# Patient Record
Sex: FEMALE | Race: WHITE | NOT HISPANIC OR LATINO | Employment: FULL TIME | URBAN - METROPOLITAN AREA
[De-identification: names, ages, dates, MRNs, and addresses within clinical notes are randomized per-mention and may not be internally consistent; named-entity substitution may affect disease eponyms.]

---

## 2024-04-12 ENCOUNTER — TELEPHONE (OUTPATIENT)
Dept: OBGYN | Facility: CLINIC | Age: 29
End: 2024-04-12
Payer: COMMERCIAL

## 2024-04-12 NOTE — LETTER
You are scheduled in clinic on April 24th at 1 PM for Mifepristone.  This is an oral medication, which will be discussed with the provider before administration.      The parking ramp connected to our building will be closed for nearby maintenenance.  You can park in the YELLOW or GREEN parking ramps and follow the signs to the [facility].  We are located on the third floor.     Induction   Your induction of labor is schedule for April 25th, 2024 at 730 AM.    You should park in the GREEN PARKING RAMP.  When you enter the hospital, stop at the security desk, and they will direct you to the Birth Place.  You will take the east elevator to the fourth floor. Follow the sign for the Birth Place door- you will have to press the buzzer to get to the nurse's station. You may check in a this nurses station.     [facility]   [address] [place] [address]   birth place phone number [phone]   fourth floor

## 2024-04-12 NOTE — LETTER
Financial Assistance for Termination of Pregnancy    Important things to know before you call:     Our clinic works closely with all of these resources. Please let them know you are working with a care coordinator at [facility] ([facility]) or [facility]. Our clinic has two names, and if you hear either of these names, please know it is the same place! Please have the cost-of-care estimate with you when you call because each resource is going to want this number. Make sure you keep detailed notes of how much each organization is going to pledge to you. Please know our  will work directly with the organization to transfer the money.     Resources/Organizations:     [facility] [phone]    [facility] [phone]    [facility] [phone]    [place] [employer] in [employer] (WIN) Fund at info@ndwinfund.org    [employer]ARY (justice empowerment network) 3-744-361-4628    [place]  Fund [phone] CECILIA,W, F 6AM-2PM    Our Justice apply online [email or web address]

## 2024-04-12 NOTE — TELEPHONE ENCOUNTER
Referral received from [facility] for IOL for TOP.  She is being referred to [facility] because of fetal anomalies-fetal cerebral ventriculomegaly  Gestational age 20w5d  EDD8/25/24  Medical records have been received    An email has been sent to Alana/financial counselor to verify insurance    Spoke with Naty and explained process.      They have not yet considered their options for disposition of remains.    They were told by [facility] that genetic testing could be done on placenta after delivery, and they would like this done.    Will contact Naty after insurance verification

## 2024-04-12 NOTE — TELEPHONE ENCOUNTER
Non-covered benefit.  Cost of care estimate as follows;    Details  Total Fees 20,049.94  Hospital Fees 16,731.94  Physician Fees 3,318.00  Fees historically vary from  14,329.92 to 41,871.61  Discounts -11,142.22  You Pay  1 of 3  8,907.72

## 2024-04-15 NOTE — TELEPHONE ENCOUNTER
Confirmed with Naty that she received cost of care estimate and financial resources.  She will reach out to resources today, and let us know when she is ready to proceed

## 2024-04-16 NOTE — TELEPHONE ENCOUNTER
Patient has secured $4500 in pledges, and has paid remaining balance.      Requests induction on 4/25/24.  Scheduled at 730 AM per her request.    She will come to clinic on 4/24/24 for mifepristone.    Letter generated.    They plan on private cremation and would like mementos collected at time of delivery

## 2024-04-18 ASSESSMENT — ANXIETY QUESTIONNAIRES
3. WORRYING TOO MUCH ABOUT DIFFERENT THINGS: NOT AT ALL
GAD7 TOTAL SCORE: 1
1. FEELING NERVOUS, ANXIOUS, OR ON EDGE: SEVERAL DAYS
4. TROUBLE RELAXING: NOT AT ALL
6. BECOMING EASILY ANNOYED OR IRRITABLE: NOT AT ALL
GAD7 TOTAL SCORE: 1
8. IF YOU CHECKED OFF ANY PROBLEMS, HOW DIFFICULT HAVE THESE MADE IT FOR YOU TO DO YOUR WORK, TAKE CARE OF THINGS AT HOME, OR GET ALONG WITH OTHER PEOPLE?: NOT DIFFICULT AT ALL
7. FEELING AFRAID AS IF SOMETHING AWFUL MIGHT HAPPEN: NOT AT ALL
7. FEELING AFRAID AS IF SOMETHING AWFUL MIGHT HAPPEN: NOT AT ALL
IF YOU CHECKED OFF ANY PROBLEMS ON THIS QUESTIONNAIRE, HOW DIFFICULT HAVE THESE PROBLEMS MADE IT FOR YOU TO DO YOUR WORK, TAKE CARE OF THINGS AT HOME, OR GET ALONG WITH OTHER PEOPLE: NOT DIFFICULT AT ALL
5. BEING SO RESTLESS THAT IT IS HARD TO SIT STILL: NOT AT ALL
2. NOT BEING ABLE TO STOP OR CONTROL WORRYING: NOT AT ALL

## 2024-04-22 NOTE — TELEPHONE ENCOUNTER
Received report from MILENA Joshi. Referral reviewed.     Patient is scheduled for an IOL on 04/25 and will be in clinic for mifepristone the day before.

## 2024-04-23 NOTE — TELEPHONE ENCOUNTER
Called patient for check in prior to upcoming procedures starting tomorrow. Patient denies questions, concerns or requests at the moment. Encouraged patient to call clinic if any questions, concerns or requests comes up. Patient agreeable.

## 2024-04-24 ENCOUNTER — OFFICE VISIT (OUTPATIENT)
Dept: OBGYN | Facility: CLINIC | Age: 29
End: 2024-04-24
Attending: OBSTETRICS & GYNECOLOGY
Payer: COMMERCIAL

## 2024-04-24 VITALS
DIASTOLIC BLOOD PRESSURE: 78 MMHG | HEIGHT: 63 IN | BODY MASS INDEX: 28.81 KG/M2 | WEIGHT: 162.6 LBS | SYSTOLIC BLOOD PRESSURE: 121 MMHG | HEART RATE: 99 BPM

## 2024-04-24 DIAGNOSIS — O35.9XX0 FETAL ANOMALY NECESSITATING DELIVERY, SINGLE OR UNSPECIFIED FETUS: Primary | ICD-10-CM

## 2024-04-24 PROCEDURE — 99214 OFFICE O/P EST MOD 30 MIN: CPT | Performed by: OBSTETRICS & GYNECOLOGY

## 2024-04-24 PROCEDURE — 250N000013 HC RX MED GY IP 250 OP 250 PS 637

## 2024-04-24 PROCEDURE — 250N000013 HC RX MED GY IP 250 OP 250 PS 637: Performed by: OBSTETRICS & GYNECOLOGY

## 2024-04-24 PROCEDURE — 99204 OFFICE O/P NEW MOD 45 MIN: CPT | Performed by: OBSTETRICS & GYNECOLOGY

## 2024-04-24 RX ORDER — MIFEPRISTONE 200 MG/1
200 TABLET ORAL ONCE
Status: DISCONTINUED | OUTPATIENT
Start: 2024-04-24 | End: 2024-04-24

## 2024-04-24 RX ADMIN — Medication 200 MG: at 13:17

## 2024-04-24 NOTE — PROGRESS NOTES
29 yo  at 20+5 weeks here for mife administration in advance of termination induction IOL  This pregnancy complicated by lethal anomalies including: hydrops, left mild fetal ventriculomegaly, monosomy X, 1%ile growth, large cystic hygroma.   Risks, options, expectations reviewed in detail  Mife administered 200 mg    Advised if any concerns overnight to call or come in  Otherwise present to L and D tomorrow    Osiris Raphael MD

## 2024-04-24 NOTE — PATIENT INSTRUCTIONS
Received a call from pt asking if he needed to reschedule appt on 4/26 with Dr. Fulton d/t lab work scheduled 4/24. Reviewed schedule. Added for labs on 4/17. Pt will need transport for 4/17 and 4/24.  Updated Gracia/Transport Thank you for trusting us with your care!     If you need to contact us for questions about:  Symptoms, Scheduling & Medical Questions; Non-urgent (2-3 day response) Chilo message, Urgent (needing response today) [phone] (if after 3:30pm next day response)   Prescriptions: Please call your Pharmacy   Billing: [place] [phone] or [facility]:[phone]

## 2024-04-24 NOTE — LETTER
2024       RE: Naty Joseph  [address] [place] [address]     Dear Colleague,    Thank you for referring your patient, Naty Joseph, to the [facility] [place] at [facility]. Please see a copy of my visit note below.    27 yo  at 20+5 weeks here for mife administration in advance of termination induction IOL  This pregnancy complicated by lethal anomalies including: hydrops, left mild fetal ventriculomegaly, monosomy X, 1%ile growth, large cystic hygroma.   Risks, options, expectations reviewed in detail  Mife administered 200 mg    Advised if any concerns overnight to call or come in  Otherwise present to L and D tomorrow    Osiris Raphael MD

## 2024-04-25 ENCOUNTER — ANESTHESIA (OUTPATIENT)
Dept: OBGYN | Facility: CLINIC | Age: 29
End: 2024-04-25
Payer: COMMERCIAL

## 2024-04-25 ENCOUNTER — HOSPITAL ENCOUNTER (INPATIENT)
Facility: CLINIC | Age: 29
LOS: 4 days | Discharge: HOME | End: 2024-04-29
Attending: OBSTETRICS & GYNECOLOGY | Admitting: OBSTETRICS & GYNECOLOGY
Payer: COMMERCIAL

## 2024-04-25 ENCOUNTER — ANESTHESIA EVENT (OUTPATIENT)
Dept: OBGYN | Facility: CLINIC | Age: 29
End: 2024-04-25
Payer: COMMERCIAL

## 2024-04-25 DIAGNOSIS — Z90.710 S/P ABDOMINAL HYSTERECTOMY: ICD-10-CM

## 2024-04-25 PROBLEM — Z34.90 PREGNANCY: Status: ACTIVE | Noted: 2024-04-25

## 2024-04-25 LAB
ABO/RH(D): NORMAL
ANION GAP SERPL CALCULATED.3IONS-SCNC: 9 MMOL/L (ref 7–15)
ANTIBODY SCREEN: NEGATIVE
APTT PPP: 36 SECONDS (ref 22–38)
APTT PPP: 39 SECONDS (ref 22–38)
APTT PPP: 44 SECONDS (ref 22–38)
BASE EXCESS BLDA CALC-SCNC: -5.2 MMOL/L (ref -3–3)
BASE EXCESS BLDA CALC-SCNC: -7.6 MMOL/L (ref -3–3)
BASE EXCESS BLDV CALC-SCNC: -3.1 MMOL/L (ref -3–3)
BASE EXCESS BLDV CALC-SCNC: -8.6 MMOL/L (ref -3–3)
BASOPHILS # BLD AUTO: 0 10E3/UL (ref 0–0.2)
BASOPHILS NFR BLD AUTO: 0 %
BLD PROD TYP BPU: NORMAL
BLOOD COMPONENT TYPE: NORMAL
BUN SERPL-MCNC: 7.5 MG/DL (ref 6–20)
CA-I BLD-MCNC: 3.4 MG/DL (ref 4.4–5.2)
CA-I BLD-MCNC: 3.7 MG/DL (ref 4.4–5.2)
CA-I BLD-MCNC: 4.5 MG/DL (ref 4.4–5.2)
CA-I BLD-MCNC: 5 MG/DL (ref 4.4–5.2)
CALCIUM SERPL-MCNC: 7.9 MG/DL (ref 8.6–10)
CHLORIDE SERPL-SCNC: 109 MMOL/L (ref 98–107)
CODING SYSTEM: NORMAL
CREAT SERPL-MCNC: 0.48 MG/DL (ref 0.51–0.95)
CROSSMATCH: NORMAL
D DIMER PPP FEU-MCNC: >20 UG/ML FEU (ref 0–0.5)
D DIMER PPP FEU-MCNC: >20 UG/ML FEU (ref 0–0.5)
DEPRECATED HCO3 PLAS-SCNC: 19 MMOL/L (ref 22–29)
EGFRCR SERPLBLD CKD-EPI 2021: >90 ML/MIN/1.73M2
EOSINOPHIL # BLD AUTO: 0 10E3/UL (ref 0–0.7)
EOSINOPHIL NFR BLD AUTO: 0 %
ERYTHROCYTE [DISTWIDTH] IN BLOOD BY AUTOMATED COUNT: 13.2 % (ref 10–15)
ERYTHROCYTE [DISTWIDTH] IN BLOOD BY AUTOMATED COUNT: 13.3 % (ref 10–15)
ERYTHROCYTE [DISTWIDTH] IN BLOOD BY AUTOMATED COUNT: 13.6 % (ref 10–15)
ERYTHROCYTE [DISTWIDTH] IN BLOOD BY AUTOMATED COUNT: 14.9 % (ref 10–15)
FIBRINOGEN PPP-MCNC: 278 MG/DL (ref 170–490)
FIBRINOGEN PPP-MCNC: 91 MG/DL (ref 170–490)
GLUCOSE BLD-MCNC: 107 MG/DL (ref 70–99)
GLUCOSE BLD-MCNC: 109 MG/DL (ref 70–99)
GLUCOSE BLD-MCNC: 154 MG/DL (ref 70–99)
GLUCOSE BLD-MCNC: 83 MG/DL (ref 70–99)
GLUCOSE SERPL-MCNC: 138 MG/DL (ref 70–99)
HCO3 BLDA-SCNC: 18 MMOL/L (ref 21–28)
HCO3 BLDA-SCNC: 20 MMOL/L (ref 21–28)
HCO3 BLDV-SCNC: 16 MMOL/L (ref 21–28)
HCO3 BLDV-SCNC: 21 MMOL/L (ref 21–28)
HCT VFR BLD AUTO: 19.3 % (ref 35–47)
HCT VFR BLD AUTO: 23.9 % (ref 35–47)
HCT VFR BLD AUTO: 29.2 % (ref 35–47)
HCT VFR BLD AUTO: 34.3 % (ref 35–47)
HGB BLD-MCNC: 10.2 G/DL (ref 11.7–15.7)
HGB BLD-MCNC: 10.6 G/DL (ref 11.7–15.7)
HGB BLD-MCNC: 11.8 G/DL (ref 11.7–15.7)
HGB BLD-MCNC: 6.7 G/DL (ref 11.7–15.7)
HGB BLD-MCNC: 8 G/DL (ref 11.7–15.7)
HGB BLD-MCNC: 8.5 G/DL (ref 11.7–15.7)
HGB BLD-MCNC: 9.1 G/DL (ref 11.7–15.7)
HGB BLD-MCNC: 9.8 G/DL (ref 11.7–15.7)
IMM GRANULOCYTES # BLD: 0 10E3/UL
IMM GRANULOCYTES NFR BLD: 0 %
INR PPP: 1.23 (ref 0.85–1.15)
INR PPP: 1.34 (ref 0.85–1.15)
INR PPP: 1.79 (ref 0.85–1.15)
ISSUE DATE AND TIME: NORMAL
LACTATE BLD-SCNC: 0.9 MMOL/L (ref 0.7–2)
LACTATE BLD-SCNC: 1.1 MMOL/L (ref 0.7–2)
LACTATE BLD-SCNC: 1.5 MMOL/L (ref 0.7–2)
LACTATE BLD-SCNC: 2.2 MMOL/L (ref 0.7–2)
LYMPHOCYTES # BLD AUTO: 1.5 10E3/UL (ref 0.8–5.3)
LYMPHOCYTES NFR BLD AUTO: 16 %
MAGNESIUM SERPL-MCNC: 1.2 MG/DL (ref 1.7–2.3)
MCH RBC QN AUTO: 29 PG (ref 26.5–33)
MCH RBC QN AUTO: 31.4 PG (ref 26.5–33)
MCH RBC QN AUTO: 31.4 PG (ref 26.5–33)
MCH RBC QN AUTO: 31.6 PG (ref 26.5–33)
MCHC RBC AUTO-ENTMCNC: 33.5 G/DL (ref 31.5–36.5)
MCHC RBC AUTO-ENTMCNC: 34.4 G/DL (ref 31.5–36.5)
MCHC RBC AUTO-ENTMCNC: 34.7 G/DL (ref 31.5–36.5)
MCHC RBC AUTO-ENTMCNC: 34.9 G/DL (ref 31.5–36.5)
MCV RBC AUTO: 87 FL (ref 78–100)
MCV RBC AUTO: 90 FL (ref 78–100)
MCV RBC AUTO: 91 FL (ref 78–100)
MCV RBC AUTO: 91 FL (ref 78–100)
MONOCYTES # BLD AUTO: 0.6 10E3/UL (ref 0–1.3)
MONOCYTES NFR BLD AUTO: 7 %
NEUTROPHILS # BLD AUTO: 7.2 10E3/UL (ref 1.6–8.3)
NEUTROPHILS NFR BLD AUTO: 77 %
NRBC # BLD AUTO: 0 10E3/UL
NRBC BLD AUTO-RTO: 0 /100
O2/TOTAL GAS SETTING VFR VENT: 30 %
O2/TOTAL GAS SETTING VFR VENT: 30 %
O2/TOTAL GAS SETTING VFR VENT: 39 %
O2/TOTAL GAS SETTING VFR VENT: 50 %
OXYHGB MFR BLDA: 98 % (ref 92–100)
OXYHGB MFR BLDA: 99 % (ref 92–100)
OXYHGB MFR BLDV: 76 % (ref 70–75)
OXYHGB MFR BLDV: 83 % (ref 70–75)
PCO2 BLDA: 35 MM HG (ref 35–45)
PCO2 BLDA: 37 MM HG (ref 35–45)
PCO2 BLDV: 29 MM HG (ref 40–50)
PCO2 BLDV: 32 MM HG (ref 40–50)
PH BLDA: 7.32 [PH] (ref 7.35–7.45)
PH BLDA: 7.35 [PH] (ref 7.35–7.45)
PH BLDV: 7.35 [PH] (ref 7.32–7.43)
PH BLDV: 7.42 [PH] (ref 7.32–7.43)
PHOSPHATE SERPL-MCNC: 4.5 MG/DL (ref 2.5–4.5)
PLATELET # BLD AUTO: 115 10E3/UL (ref 150–450)
PLATELET # BLD AUTO: 166 10E3/UL (ref 150–450)
PLATELET # BLD AUTO: 173 10E3/UL (ref 150–450)
PLATELET # BLD AUTO: 230 10E3/UL (ref 150–450)
PO2 BLDA: 221 MM HG (ref 80–105)
PO2 BLDA: 235 MM HG (ref 80–105)
PO2 BLDV: 44 MM HG (ref 25–47)
PO2 BLDV: 47 MM HG (ref 25–47)
POTASSIUM BLD-SCNC: 2.6 MMOL/L (ref 3.4–5.3)
POTASSIUM BLD-SCNC: 3.5 MMOL/L (ref 3.4–5.3)
POTASSIUM BLD-SCNC: 3.9 MMOL/L (ref 3.4–5.3)
POTASSIUM BLD-SCNC: 4 MMOL/L (ref 3.4–5.3)
POTASSIUM SERPL-SCNC: 4.4 MMOL/L (ref 3.4–5.3)
RBC # BLD AUTO: 2.12 10E6/UL (ref 3.8–5.2)
RBC # BLD AUTO: 2.76 10E6/UL (ref 3.8–5.2)
RBC # BLD AUTO: 3.25 10E6/UL (ref 3.8–5.2)
RBC # BLD AUTO: 3.76 10E6/UL (ref 3.8–5.2)
SAO2 % BLDA: >100 % (ref 96–97)
SAO2 % BLDA: >100 % (ref 96–97)
SAO2 % BLDV: 79 % (ref 70–75)
SAO2 % BLDV: 85 % (ref 70–75)
SODIUM BLD-SCNC: 136 MMOL/L (ref 135–145)
SODIUM BLD-SCNC: 136 MMOL/L (ref 135–145)
SODIUM BLD-SCNC: 137 MMOL/L (ref 135–145)
SODIUM BLD-SCNC: 138 MMOL/L (ref 135–145)
SODIUM SERPL-SCNC: 137 MMOL/L (ref 135–145)
SPECIMEN EXPIRATION DATE: NORMAL
UNIT ABO/RH: NORMAL
UNIT NUMBER: NORMAL
UNIT STATUS: NORMAL
UNIT TYPE ISBT: 5100
UNIT TYPE ISBT: 6200
UNIT TYPE ISBT: 7300
UNIT TYPE ISBT: 8400
WBC # BLD AUTO: 12.4 10E3/UL (ref 4–11)
WBC # BLD AUTO: 14.7 10E3/UL (ref 4–11)
WBC # BLD AUTO: 8.4 10E3/UL (ref 4–11)
WBC # BLD AUTO: 9.5 10E3/UL (ref 4–11)

## 2024-04-25 PROCEDURE — 250N000011 HC RX IP 250 OP 636: Performed by: STUDENT IN AN ORGANIZED HEALTH CARE EDUCATION/TRAINING PROGRAM

## 2024-04-25 PROCEDURE — 86900 BLOOD TYPING SEROLOGIC ABO: CPT

## 2024-04-25 PROCEDURE — 722N000001 HC LABOR CARE VAGINAL DELIVERY SINGLE

## 2024-04-25 PROCEDURE — P9012 CRYOPRECIPITATE EACH UNIT: HCPCS

## 2024-04-25 PROCEDURE — 88307 TISSUE EXAM BY PATHOLOGIST: CPT | Mod: TC | Performed by: OBSTETRICS & GYNECOLOGY

## 2024-04-25 PROCEDURE — 258N000003 HC RX IP 258 OP 636

## 2024-04-25 PROCEDURE — 10D17ZZ EXTRACTION OF PRODUCTS OF CONCEPTION, RETAINED, VIA NATURAL OR ARTIFICIAL OPENING: ICD-10-PCS | Performed by: OBSTETRICS & GYNECOLOGY

## 2024-04-25 PROCEDURE — 88307 TISSUE EXAM BY PATHOLOGIST: CPT | Mod: 26 | Performed by: PATHOLOGY

## 2024-04-25 PROCEDURE — 85730 THROMBOPLASTIN TIME PARTIAL: CPT

## 2024-04-25 PROCEDURE — 82330 ASSAY OF CALCIUM: CPT

## 2024-04-25 PROCEDURE — 250N000013 HC RX MED GY IP 250 OP 250 PS 637

## 2024-04-25 PROCEDURE — 250N000025 HC SEVOFLURANE, PER MIN: Performed by: OBSTETRICS & GYNECOLOGY

## 2024-04-25 PROCEDURE — 120N000002 HC R&B MED SURG/OB UMMC

## 2024-04-25 PROCEDURE — 85025 COMPLETE CBC W/AUTO DIFF WBC: CPT

## 2024-04-25 PROCEDURE — 86923 COMPATIBILITY TEST ELECTRIC: CPT

## 2024-04-25 PROCEDURE — 82805 BLOOD GASES W/O2 SATURATION: CPT

## 2024-04-25 PROCEDURE — P9016 RBC LEUKOCYTES REDUCED: HCPCS

## 2024-04-25 PROCEDURE — 85027 COMPLETE CBC AUTOMATED: CPT

## 2024-04-25 PROCEDURE — 258N000003 HC RX IP 258 OP 636: Performed by: STUDENT IN AN ORGANIZED HEALTH CARE EDUCATION/TRAINING PROGRAM

## 2024-04-25 PROCEDURE — 85610 PROTHROMBIN TIME: CPT | Performed by: OBSTETRICS & GYNECOLOGY

## 2024-04-25 PROCEDURE — P9037 PLATE PHERES LEUKOREDU IRRAD: HCPCS

## 2024-04-25 PROCEDURE — 58120 DILATION AND CURETTAGE: CPT | Performed by: STUDENT IN AN ORGANIZED HEALTH CARE EDUCATION/TRAINING PROGRAM

## 2024-04-25 PROCEDURE — 250N000009 HC RX 250

## 2024-04-25 PROCEDURE — 85384 FIBRINOGEN ACTIVITY: CPT | Performed by: OBSTETRICS & GYNECOLOGY

## 2024-04-25 PROCEDURE — 59899 UNLISTED PX MAT CARE&DLVR: CPT | Mod: 78 | Performed by: OBSTETRICS & GYNECOLOGY

## 2024-04-25 PROCEDURE — 84132 ASSAY OF SERUM POTASSIUM: CPT | Performed by: OBSTETRICS & GYNECOLOGY

## 2024-04-25 PROCEDURE — 36415 COLL VENOUS BLD VENIPUNCTURE: CPT

## 2024-04-25 PROCEDURE — 85379 FIBRIN DEGRADATION QUANT: CPT | Performed by: OBSTETRICS & GYNECOLOGY

## 2024-04-25 PROCEDURE — 36415 COLL VENOUS BLD VENIPUNCTURE: CPT | Performed by: OBSTETRICS & GYNECOLOGY

## 2024-04-25 PROCEDURE — 250N000011 HC RX IP 250 OP 636

## 2024-04-25 PROCEDURE — 85027 COMPLETE CBC AUTOMATED: CPT | Performed by: OBSTETRICS & GYNECOLOGY

## 2024-04-25 PROCEDURE — 3E0P7VZ INTRODUCTION OF HORMONE INTO FEMALE REPRODUCTIVE, VIA NATURAL OR ARTIFICIAL OPENING: ICD-10-PCS | Performed by: OBSTETRICS & GYNECOLOGY

## 2024-04-25 PROCEDURE — 250N000013 HC RX MED GY IP 250 OP 250 PS 637: Performed by: OBSTETRICS & GYNECOLOGY

## 2024-04-25 PROCEDURE — 370N000017 HC ANESTHESIA TECHNICAL FEE, PER MIN: Performed by: OBSTETRICS & GYNECOLOGY

## 2024-04-25 PROCEDURE — 99291 CRITICAL CARE FIRST HOUR: CPT | Mod: 24

## 2024-04-25 PROCEDURE — 250N000011 HC RX IP 250 OP 636: Mod: JZ | Performed by: OBSTETRICS & GYNECOLOGY

## 2024-04-25 PROCEDURE — 250N000009 HC RX 250: Performed by: OBSTETRICS & GYNECOLOGY

## 2024-04-25 PROCEDURE — 85384 FIBRINOGEN ACTIVITY: CPT

## 2024-04-25 PROCEDURE — 0W3R7ZZ CONTROL BLEEDING IN GENITOURINARY TRACT, VIA NATURAL OR ARTIFICIAL OPENING: ICD-10-PCS | Performed by: OBSTETRICS & GYNECOLOGY

## 2024-04-25 PROCEDURE — 85610 PROTHROMBIN TIME: CPT

## 2024-04-25 PROCEDURE — 250N000011 HC RX IP 250 OP 636: Performed by: OBSTETRICS & GYNECOLOGY

## 2024-04-25 PROCEDURE — 84100 ASSAY OF PHOSPHORUS: CPT | Performed by: OBSTETRICS & GYNECOLOGY

## 2024-04-25 PROCEDURE — 85730 THROMBOPLASTIN TIME PARTIAL: CPT | Performed by: OBSTETRICS & GYNECOLOGY

## 2024-04-25 PROCEDURE — P9059 PLASMA, FRZ BETWEEN 8-24HOUR: HCPCS

## 2024-04-25 PROCEDURE — 272N000001 HC OR GENERAL SUPPLY STERILE: Performed by: OBSTETRICS & GYNECOLOGY

## 2024-04-25 PROCEDURE — 360N000076 HC SURGERY LEVEL 3, PER MIN: Performed by: OBSTETRICS & GYNECOLOGY

## 2024-04-25 PROCEDURE — 0UT90ZZ RESECTION OF UTERUS, OPEN APPROACH: ICD-10-PCS | Performed by: OBSTETRICS & GYNECOLOGY

## 2024-04-25 PROCEDURE — 59409 OBSTETRICAL CARE: CPT | Performed by: OBSTETRICS & GYNECOLOGY

## 2024-04-25 PROCEDURE — 83735 ASSAY OF MAGNESIUM: CPT | Performed by: OBSTETRICS & GYNECOLOGY

## 2024-04-25 PROCEDURE — 0UB77ZZ EXCISION OF BILATERAL FALLOPIAN TUBES, VIA NATURAL OR ARTIFICIAL OPENING: ICD-10-PCS | Performed by: OBSTETRICS & GYNECOLOGY

## 2024-04-25 PROCEDURE — 59160 D & C AFTER DELIVERY: CPT | Mod: 78 | Performed by: OBSTETRICS & GYNECOLOGY

## 2024-04-25 RX ORDER — FENTANYL CITRATE-0.9 % NACL/PF 10 MCG/ML
100 PLASTIC BAG, INJECTION (ML) INTRAVENOUS EVERY 5 MIN PRN
Status: DISCONTINUED | OUTPATIENT
Start: 2024-04-25 | End: 2024-04-26

## 2024-04-25 RX ORDER — NALBUPHINE HYDROCHLORIDE 20 MG/ML
2.5-5 INJECTION, SOLUTION INTRAMUSCULAR; INTRAVENOUS; SUBCUTANEOUS EVERY 6 HOURS PRN
Status: DISCONTINUED | OUTPATIENT
Start: 2024-04-25 | End: 2024-04-26

## 2024-04-25 RX ORDER — SODIUM CHLORIDE 9 MG/ML
INJECTION, SOLUTION INTRAVENOUS CONTINUOUS PRN
Status: DISCONTINUED | OUTPATIENT
Start: 2024-04-25 | End: 2024-04-25

## 2024-04-25 RX ORDER — CEFAZOLIN SODIUM 2 G/100ML
INJECTION, SOLUTION INTRAVENOUS
Status: DISCONTINUED
Start: 2024-04-25 | End: 2024-04-25 | Stop reason: HOSPADM

## 2024-04-25 RX ORDER — NALOXONE HYDROCHLORIDE 0.4 MG/ML
0.2 INJECTION, SOLUTION INTRAMUSCULAR; INTRAVENOUS; SUBCUTANEOUS
Status: DISCONTINUED | OUTPATIENT
Start: 2024-04-25 | End: 2024-04-25 | Stop reason: HOSPADM

## 2024-04-25 RX ORDER — FENTANYL/ROPIVACAINE/NS/PF 2MCG/ML-.1
PLASTIC BAG, INJECTION (ML) EPIDURAL
Status: DISCONTINUED | OUTPATIENT
Start: 2024-04-25 | End: 2024-04-25 | Stop reason: HOSPADM

## 2024-04-25 RX ORDER — OXYTOCIN/0.9 % SODIUM CHLORIDE 30/500 ML
340 PLASTIC BAG, INJECTION (ML) INTRAVENOUS CONTINUOUS PRN
Status: DISCONTINUED | OUTPATIENT
Start: 2024-04-25 | End: 2024-04-25 | Stop reason: HOSPADM

## 2024-04-25 RX ORDER — SODIUM CHLORIDE, SODIUM LACTATE, POTASSIUM CHLORIDE, CALCIUM CHLORIDE 600; 310; 30; 20 MG/100ML; MG/100ML; MG/100ML; MG/100ML
INJECTION, SOLUTION INTRAVENOUS CONTINUOUS
Status: DISCONTINUED | OUTPATIENT
Start: 2024-04-25 | End: 2024-04-26

## 2024-04-25 RX ORDER — IBUPROFEN 800 MG/1
800 TABLET, FILM COATED ORAL
Status: DISCONTINUED | OUTPATIENT
Start: 2024-04-25 | End: 2024-04-29 | Stop reason: HOSPADM

## 2024-04-25 RX ORDER — CITRIC ACID/SODIUM CITRATE 334-500MG
SOLUTION, ORAL ORAL
Status: COMPLETED
Start: 2024-04-25 | End: 2024-04-25

## 2024-04-25 RX ORDER — BISACODYL 10 MG
10 SUPPOSITORY, RECTAL RECTAL DAILY PRN
Status: DISCONTINUED | OUTPATIENT
Start: 2024-04-25 | End: 2024-04-26

## 2024-04-25 RX ORDER — CALCIUM CHLORIDE 100 MG/ML
INJECTION INTRAVENOUS; INTRAVENTRICULAR PRN
Status: DISCONTINUED | OUTPATIENT
Start: 2024-04-25 | End: 2024-04-25

## 2024-04-25 RX ORDER — LOPERAMIDE HCL 2 MG
2 CAPSULE ORAL
Status: DISCONTINUED | OUTPATIENT
Start: 2024-04-25 | End: 2024-04-29 | Stop reason: HOSPADM

## 2024-04-25 RX ORDER — NALOXONE HYDROCHLORIDE 0.4 MG/ML
0.4 INJECTION, SOLUTION INTRAMUSCULAR; INTRAVENOUS; SUBCUTANEOUS
Status: DISCONTINUED | OUTPATIENT
Start: 2024-04-25 | End: 2024-04-25 | Stop reason: HOSPADM

## 2024-04-25 RX ORDER — CARBOPROST TROMETHAMINE 250 UG/ML
250 INJECTION, SOLUTION INTRAMUSCULAR
Status: DISCONTINUED | OUTPATIENT
Start: 2024-04-25 | End: 2024-04-25 | Stop reason: HOSPADM

## 2024-04-25 RX ORDER — METHYLERGONOVINE MALEATE 0.2 MG/ML
200 INJECTION INTRAVENOUS
Status: DISCONTINUED | OUTPATIENT
Start: 2024-04-25 | End: 2024-04-29 | Stop reason: HOSPADM

## 2024-04-25 RX ORDER — MISOPROSTOL 200 UG/1
800 TABLET ORAL
Status: DISCONTINUED | OUTPATIENT
Start: 2024-04-25 | End: 2024-04-25 | Stop reason: HOSPADM

## 2024-04-25 RX ORDER — LOPERAMIDE HCL 2 MG
2 CAPSULE ORAL
Status: DISCONTINUED | OUTPATIENT
Start: 2024-04-25 | End: 2024-04-25 | Stop reason: HOSPADM

## 2024-04-25 RX ORDER — DOCUSATE SODIUM 100 MG/1
100 CAPSULE, LIQUID FILLED ORAL DAILY
Status: DISCONTINUED | OUTPATIENT
Start: 2024-04-25 | End: 2024-04-29 | Stop reason: HOSPADM

## 2024-04-25 RX ORDER — SODIUM CHLORIDE, SODIUM LACTATE, POTASSIUM CHLORIDE, CALCIUM CHLORIDE 600; 310; 30; 20 MG/100ML; MG/100ML; MG/100ML; MG/100ML
INJECTION, SOLUTION INTRAVENOUS CONTINUOUS PRN
Status: DISCONTINUED | OUTPATIENT
Start: 2024-04-25 | End: 2024-04-25

## 2024-04-25 RX ORDER — TRANEXAMIC ACID 10 MG/ML
1 INJECTION, SOLUTION INTRAVENOUS EVERY 30 MIN PRN
Status: COMPLETED | OUTPATIENT
Start: 2024-04-25 | End: 2024-04-25

## 2024-04-25 RX ORDER — FENTANYL CITRATE-0.9 % NACL/PF 10 MCG/ML
100 PLASTIC BAG, INJECTION (ML) INTRAVENOUS EVERY 5 MIN PRN
Status: COMPLETED | OUTPATIENT
Start: 2024-04-25 | End: 2024-04-25

## 2024-04-25 RX ORDER — MIFEPRISTONE 200 MG/1
200 TABLET ORAL ONCE
Status: COMPLETED | OUTPATIENT
Start: 2024-04-25 | End: 2024-04-25

## 2024-04-25 RX ORDER — CEFAZOLIN SODIUM/WATER 2 G/20 ML
SYRINGE (ML) INTRAVENOUS PRN
Status: DISCONTINUED | OUTPATIENT
Start: 2024-04-25 | End: 2024-04-25

## 2024-04-25 RX ORDER — TRANEXAMIC ACID 10 MG/ML
1 INJECTION, SOLUTION INTRAVENOUS EVERY 30 MIN PRN
Status: DISCONTINUED | OUTPATIENT
Start: 2024-04-25 | End: 2024-04-25 | Stop reason: HOSPADM

## 2024-04-25 RX ORDER — FENTANYL CITRATE 50 UG/ML
INJECTION, SOLUTION INTRAMUSCULAR; INTRAVENOUS
Status: COMPLETED
Start: 2024-04-25 | End: 2024-04-25

## 2024-04-25 RX ORDER — OXYTOCIN/0.9 % SODIUM CHLORIDE 30/500 ML
100-340 PLASTIC BAG, INJECTION (ML) INTRAVENOUS CONTINUOUS PRN
Status: DISCONTINUED | OUTPATIENT
Start: 2024-04-25 | End: 2024-04-29 | Stop reason: HOSPADM

## 2024-04-25 RX ORDER — FENTANYL CITRATE 50 UG/ML
100 INJECTION, SOLUTION INTRAMUSCULAR; INTRAVENOUS
Status: DISCONTINUED | OUTPATIENT
Start: 2024-04-25 | End: 2024-04-26

## 2024-04-25 RX ORDER — PROCHLORPERAZINE MALEATE 10 MG
10 TABLET ORAL EVERY 6 HOURS PRN
Status: DISCONTINUED | OUTPATIENT
Start: 2024-04-25 | End: 2024-04-25 | Stop reason: HOSPADM

## 2024-04-25 RX ORDER — FENTANYL/ROPIVACAINE/NS/PF 2MCG/ML-.1
PLASTIC BAG, INJECTION (ML) EPIDURAL
Status: COMPLETED
Start: 2024-04-25 | End: 2024-04-25

## 2024-04-25 RX ORDER — OXYTOCIN 10 [USP'U]/ML
10 INJECTION, SOLUTION INTRAMUSCULAR; INTRAVENOUS
Status: DISCONTINUED | OUTPATIENT
Start: 2024-04-25 | End: 2024-04-25 | Stop reason: HOSPADM

## 2024-04-25 RX ORDER — CEFAZOLIN SODIUM/WATER 2 G/20 ML
2 SYRINGE (ML) INTRAVENOUS
Status: CANCELLED | OUTPATIENT
Start: 2024-04-25

## 2024-04-25 RX ORDER — HYDROCORTISONE 25 MG/G
CREAM TOPICAL 3 TIMES DAILY PRN
Status: DISCONTINUED | OUTPATIENT
Start: 2024-04-25 | End: 2024-04-29 | Stop reason: HOSPADM

## 2024-04-25 RX ORDER — METHYLERGONOVINE MALEATE 0.2 MG/ML
200 INJECTION INTRAVENOUS
Status: DISCONTINUED | OUTPATIENT
Start: 2024-04-25 | End: 2024-04-25 | Stop reason: HOSPADM

## 2024-04-25 RX ORDER — CEFAZOLIN SODIUM/WATER 2 G/20 ML
2 SYRINGE (ML) INTRAVENOUS SEE ADMIN INSTRUCTIONS
Status: CANCELLED | OUTPATIENT
Start: 2024-04-25

## 2024-04-25 RX ORDER — DIPHENOXYLATE HCL/ATROPINE 2.5-.025MG
2 TABLET ORAL EVERY 6 HOURS PRN
Status: DISCONTINUED | OUTPATIENT
Start: 2024-04-25 | End: 2024-04-25 | Stop reason: HOSPADM

## 2024-04-25 RX ORDER — LOPERAMIDE HCL 2 MG
4 CAPSULE ORAL
Status: DISCONTINUED | OUTPATIENT
Start: 2024-04-25 | End: 2024-04-25 | Stop reason: HOSPADM

## 2024-04-25 RX ORDER — METRONIDAZOLE 500 MG/100ML
500 INJECTION, SOLUTION INTRAVENOUS ONCE
Status: COMPLETED | OUTPATIENT
Start: 2024-04-25 | End: 2024-04-25

## 2024-04-25 RX ORDER — SODIUM CHLORIDE, SODIUM LACTATE, POTASSIUM CHLORIDE, CALCIUM CHLORIDE 600; 310; 30; 20 MG/100ML; MG/100ML; MG/100ML; MG/100ML
INJECTION, SOLUTION INTRAVENOUS
Status: DISCONTINUED
Start: 2024-04-25 | End: 2024-04-25 | Stop reason: HOSPADM

## 2024-04-25 RX ORDER — PROPOFOL 10 MG/ML
INJECTION, EMULSION INTRAVENOUS CONTINUOUS PRN
Status: DISCONTINUED | OUTPATIENT
Start: 2024-04-25 | End: 2024-04-25

## 2024-04-25 RX ORDER — METOCLOPRAMIDE HYDROCHLORIDE 5 MG/ML
10 INJECTION INTRAMUSCULAR; INTRAVENOUS EVERY 6 HOURS PRN
Status: DISCONTINUED | OUTPATIENT
Start: 2024-04-25 | End: 2024-04-25 | Stop reason: HOSPADM

## 2024-04-25 RX ORDER — LOPERAMIDE HCL 2 MG
4 CAPSULE ORAL
Status: COMPLETED | OUTPATIENT
Start: 2024-04-25 | End: 2024-04-25

## 2024-04-25 RX ORDER — ONDANSETRON 4 MG/1
4 TABLET, ORALLY DISINTEGRATING ORAL EVERY 6 HOURS PRN
Status: DISCONTINUED | OUTPATIENT
Start: 2024-04-25 | End: 2024-04-25 | Stop reason: HOSPADM

## 2024-04-25 RX ORDER — ACETAMINOPHEN 325 MG/1
650 TABLET ORAL EVERY 4 HOURS PRN
Status: DISCONTINUED | OUTPATIENT
Start: 2024-04-25 | End: 2024-04-27

## 2024-04-25 RX ORDER — NALBUPHINE HYDROCHLORIDE 20 MG/ML
2.5-5 INJECTION, SOLUTION INTRAMUSCULAR; INTRAVENOUS; SUBCUTANEOUS EVERY 6 HOURS PRN
Status: DISCONTINUED | OUTPATIENT
Start: 2024-04-25 | End: 2024-04-29 | Stop reason: HOSPADM

## 2024-04-25 RX ORDER — OXYTOCIN 10 [USP'U]/ML
10 INJECTION, SOLUTION INTRAMUSCULAR; INTRAVENOUS
Status: DISCONTINUED | OUTPATIENT
Start: 2024-04-25 | End: 2024-04-29 | Stop reason: HOSPADM

## 2024-04-25 RX ORDER — LIDOCAINE HYDROCHLORIDE 20 MG/ML
INJECTION, SOLUTION INFILTRATION; PERINEURAL PRN
Status: DISCONTINUED | OUTPATIENT
Start: 2024-04-25 | End: 2024-04-25

## 2024-04-25 RX ORDER — ACETAMINOPHEN 325 MG/1
975 TABLET ORAL ONCE
Status: CANCELLED | OUTPATIENT
Start: 2024-04-25 | End: 2024-04-25

## 2024-04-25 RX ORDER — KETOROLAC TROMETHAMINE 30 MG/ML
30 INJECTION, SOLUTION INTRAMUSCULAR; INTRAVENOUS
Status: DISCONTINUED | OUTPATIENT
Start: 2024-04-25 | End: 2024-04-29 | Stop reason: HOSPADM

## 2024-04-25 RX ORDER — FENTANYL CITRATE-0.9 % NACL/PF 10 MCG/ML
100 PLASTIC BAG, INJECTION (ML) INTRAVENOUS EVERY 5 MIN PRN
Status: DISCONTINUED | OUTPATIENT
Start: 2024-04-25 | End: 2024-04-25 | Stop reason: HOSPADM

## 2024-04-25 RX ORDER — MISOPROSTOL 200 UG/1
400 TABLET ORAL
Status: DISCONTINUED | OUTPATIENT
Start: 2024-04-25 | End: 2024-04-29 | Stop reason: HOSPADM

## 2024-04-25 RX ORDER — PROPOFOL 10 MG/ML
INJECTION, EMULSION INTRAVENOUS PRN
Status: DISCONTINUED | OUTPATIENT
Start: 2024-04-25 | End: 2024-04-25

## 2024-04-25 RX ORDER — PROCHLORPERAZINE 25 MG
25 SUPPOSITORY, RECTAL RECTAL EVERY 12 HOURS PRN
Status: DISCONTINUED | OUTPATIENT
Start: 2024-04-25 | End: 2024-04-25 | Stop reason: HOSPADM

## 2024-04-25 RX ORDER — ONDANSETRON 2 MG/ML
INJECTION INTRAMUSCULAR; INTRAVENOUS PRN
Status: DISCONTINUED | OUTPATIENT
Start: 2024-04-25 | End: 2024-04-25

## 2024-04-25 RX ORDER — ONDANSETRON 2 MG/ML
4 INJECTION INTRAMUSCULAR; INTRAVENOUS EVERY 6 HOURS PRN
Status: DISCONTINUED | OUTPATIENT
Start: 2024-04-25 | End: 2024-04-25 | Stop reason: HOSPADM

## 2024-04-25 RX ORDER — MODIFIED LANOLIN
OINTMENT (GRAM) TOPICAL
Status: DISCONTINUED | OUTPATIENT
Start: 2024-04-25 | End: 2024-04-29 | Stop reason: HOSPADM

## 2024-04-25 RX ORDER — METOCLOPRAMIDE 10 MG/1
10 TABLET ORAL EVERY 6 HOURS PRN
Status: DISCONTINUED | OUTPATIENT
Start: 2024-04-25 | End: 2024-04-25 | Stop reason: HOSPADM

## 2024-04-25 RX ORDER — MISOPROSTOL 200 UG/1
800 TABLET ORAL
Status: DISCONTINUED | OUTPATIENT
Start: 2024-04-25 | End: 2024-04-29 | Stop reason: HOSPADM

## 2024-04-25 RX ORDER — CITRIC ACID/SODIUM CITRATE 334-500MG
30 SOLUTION, ORAL ORAL
Status: DISCONTINUED | OUTPATIENT
Start: 2024-04-25 | End: 2024-04-25 | Stop reason: HOSPADM

## 2024-04-25 RX ORDER — MISOPROSTOL 200 UG/1
400 TABLET ORAL
Status: DISCONTINUED | OUTPATIENT
Start: 2024-04-25 | End: 2024-04-25 | Stop reason: HOSPADM

## 2024-04-25 RX ORDER — DIPHENOXYLATE HCL/ATROPINE 2.5-.025MG
2 TABLET ORAL ONCE
Status: COMPLETED | OUTPATIENT
Start: 2024-04-25 | End: 2024-04-25

## 2024-04-25 RX ORDER — FENTANYL CITRATE-0.9 % NACL/PF 10 MCG/ML
PLASTIC BAG, INJECTION (ML) INTRAVENOUS
Status: DISCONTINUED
Start: 2024-04-25 | End: 2024-04-25 | Stop reason: HOSPADM

## 2024-04-25 RX ORDER — ACETAMINOPHEN 325 MG/1
650 TABLET ORAL EVERY 4 HOURS PRN
Status: DISCONTINUED | OUTPATIENT
Start: 2024-04-25 | End: 2024-04-25 | Stop reason: HOSPADM

## 2024-04-25 RX ORDER — IBUPROFEN 800 MG/1
800 TABLET, FILM COATED ORAL EVERY 6 HOURS PRN
Status: DISCONTINUED | OUTPATIENT
Start: 2024-04-25 | End: 2024-04-27

## 2024-04-25 RX ORDER — FENTANYL/ROPIVACAINE/NS/PF 2MCG/ML-.1
PLASTIC BAG, INJECTION (ML) EPIDURAL
Status: DISCONTINUED | OUTPATIENT
Start: 2024-04-26 | End: 2024-04-26

## 2024-04-25 RX ORDER — OXYTOCIN/0.9 % SODIUM CHLORIDE 30/500 ML
340 PLASTIC BAG, INJECTION (ML) INTRAVENOUS CONTINUOUS PRN
Status: DISCONTINUED | OUTPATIENT
Start: 2024-04-25 | End: 2024-04-29 | Stop reason: HOSPADM

## 2024-04-25 RX ORDER — CARBOPROST TROMETHAMINE 250 UG/ML
250 INJECTION, SOLUTION INTRAMUSCULAR
Status: DISCONTINUED | OUTPATIENT
Start: 2024-04-25 | End: 2024-04-29 | Stop reason: HOSPADM

## 2024-04-25 RX ADMIN — SODIUM CITRATE AND CITRIC ACID MONOHYDRATE 30 ML: 500; 334 SOLUTION ORAL at 20:43

## 2024-04-25 RX ADMIN — SODIUM CHLORIDE, POTASSIUM CHLORIDE, SODIUM LACTATE AND CALCIUM CHLORIDE 1000 ML: 600; 310; 30; 20 INJECTION, SOLUTION INTRAVENOUS at 14:48

## 2024-04-25 RX ADMIN — PROPOFOL 100 MCG/KG/MIN: 10 INJECTION, EMULSION INTRAVENOUS at 22:03

## 2024-04-25 RX ADMIN — Medication 100 MCG: at 16:04

## 2024-04-25 RX ADMIN — Medication 100 MCG: at 15:55

## 2024-04-25 RX ADMIN — METHYLERGONOVINE MALEATE 200 MCG: 0.2 INJECTION INTRAVENOUS at 19:31

## 2024-04-25 RX ADMIN — SODIUM CHLORIDE, POTASSIUM CHLORIDE, SODIUM LACTATE AND CALCIUM CHLORIDE 1000 ML: 600; 310; 30; 20 INJECTION, SOLUTION INTRAVENOUS at 15:30

## 2024-04-25 RX ADMIN — Medication 100 MCG: at 16:25

## 2024-04-25 RX ADMIN — SODIUM CHLORIDE, POTASSIUM CHLORIDE, SODIUM LACTATE AND CALCIUM CHLORIDE: 600; 310; 30; 20 INJECTION, SOLUTION INTRAVENOUS at 21:55

## 2024-04-25 RX ADMIN — SODIUM CHLORIDE: 9 INJECTION, SOLUTION INTRAVENOUS at 23:04

## 2024-04-25 RX ADMIN — Medication 2 G: at 20:57

## 2024-04-25 RX ADMIN — ONDANSETRON 4 MG: 2 INJECTION INTRAMUSCULAR; INTRAVENOUS at 21:00

## 2024-04-25 RX ADMIN — TRANEXAMIC ACID 1 G: 10 INJECTION, SOLUTION INTRAVENOUS at 19:37

## 2024-04-25 RX ADMIN — KETOROLAC TROMETHAMINE 30 MG: 30 INJECTION, SOLUTION INTRAMUSCULAR at 19:17

## 2024-04-25 RX ADMIN — Medication 100 MCG: at 15:42

## 2024-04-25 RX ADMIN — BUPIVACAINE HYDROCHLORIDE 10 ML: 2.5 INJECTION, SOLUTION EPIDURAL; INFILTRATION; INTRACAUDAL at 15:28

## 2024-04-25 RX ADMIN — Medication 2 G: at 22:04

## 2024-04-25 RX ADMIN — Medication 100 MCG: at 16:37

## 2024-04-25 RX ADMIN — FENTANYL CITRATE 100 MCG: 50 INJECTION, SOLUTION INTRAMUSCULAR; INTRAVENOUS at 20:29

## 2024-04-25 RX ADMIN — LOPERAMIDE HYDROCHLORIDE 4 MG: 2 CAPSULE ORAL at 20:30

## 2024-04-25 RX ADMIN — TRANEXAMIC ACID 1 G: 10 INJECTION, SOLUTION INTRAVENOUS at 21:28

## 2024-04-25 RX ADMIN — MISOPROSTOL 400 MCG: 200 TABLET ORAL at 18:39

## 2024-04-25 RX ADMIN — Medication: at 15:26

## 2024-04-25 RX ADMIN — MIDAZOLAM 2 MG: 1 INJECTION INTRAMUSCULAR; INTRAVENOUS at 21:32

## 2024-04-25 RX ADMIN — Medication 100 MG: at 21:59

## 2024-04-25 RX ADMIN — ROCURONIUM BROMIDE 50 MG: 10 INJECTION, SOLUTION INTRAVENOUS at 23:08

## 2024-04-25 RX ADMIN — ACETAMINOPHEN 650 MG: 325 TABLET, FILM COATED ORAL at 17:49

## 2024-04-25 RX ADMIN — Medication 100 MCG: at 15:47

## 2024-04-25 RX ADMIN — Medication 100 MCG: at 16:49

## 2024-04-25 RX ADMIN — MISOPROSTOL 400 MCG: 200 TABLET ORAL at 11:57

## 2024-04-25 RX ADMIN — LIDOCAINE HYDROCHLORIDE 100 MG: 20 INJECTION, SOLUTION INFILTRATION; PERINEURAL at 21:59

## 2024-04-25 RX ADMIN — Medication 340 ML/HR: at 18:48

## 2024-04-25 RX ADMIN — LIDOCAINE HYDROCHLORIDE 5 ML: 20 INJECTION, SOLUTION INTRAVENOUS at 20:56

## 2024-04-25 RX ADMIN — CALCIUM CHLORIDE 1 G: 100 INJECTION, SOLUTION INTRAVENOUS at 22:12

## 2024-04-25 RX ADMIN — ROCURONIUM BROMIDE 50 MG: 10 INJECTION, SOLUTION INTRAVENOUS at 22:03

## 2024-04-25 RX ADMIN — FENTANYL CITRATE 100 MCG: 50 INJECTION, SOLUTION INTRAMUSCULAR; INTRAVENOUS at 14:19

## 2024-04-25 RX ADMIN — CARBOPROST TROMETHAMINE 250 MCG: 250 INJECTION, SOLUTION INTRAMUSCULAR at 20:27

## 2024-04-25 RX ADMIN — SODIUM CHLORIDE: 9 INJECTION, SOLUTION INTRAVENOUS at 21:53

## 2024-04-25 RX ADMIN — MISOPROSTOL 400 MCG: 200 TABLET ORAL at 08:56

## 2024-04-25 RX ADMIN — METRONIDAZOLE 500 MG: 500 INJECTION, SOLUTION INTRAVENOUS at 22:18

## 2024-04-25 RX ADMIN — Medication 150 ML/HR: at 20:48

## 2024-04-25 RX ADMIN — PROPOFOL 150 MG: 10 INJECTION, EMULSION INTRAVENOUS at 21:59

## 2024-04-25 RX ADMIN — MISOPROSTOL 400 MCG: 200 TABLET ORAL at 15:34

## 2024-04-25 RX ADMIN — CALCIUM CHLORIDE 1 G: 100 INJECTION, SOLUTION INTRAVENOUS at 23:10

## 2024-04-25 RX ADMIN — SODIUM CHLORIDE, POTASSIUM CHLORIDE, SODIUM LACTATE AND CALCIUM CHLORIDE: 600; 310; 30; 20 INJECTION, SOLUTION INTRAVENOUS at 17:37

## 2024-04-25 RX ADMIN — PHENYLEPHRINE HYDROCHLORIDE 50 MCG/MIN: 10 INJECTION INTRAVENOUS at 21:04

## 2024-04-25 RX ADMIN — DIPHENOXYLATE HYDROCHLORIDE AND ATROPINE SULFATE 2 TABLET: 2.5; .025 TABLET ORAL at 08:55

## 2024-04-25 RX ADMIN — Medication 100 MCG: at 16:43

## 2024-04-25 RX ADMIN — LIDOCAINE HYDROCHLORIDE 5 ML: 20 INJECTION, SOLUTION INTRAVENOUS at 20:49

## 2024-04-25 ASSESSMENT — ACTIVITIES OF DAILY LIVING (ADL)
ADLS_ACUITY_SCORE: 35
ADLS_ACUITY_SCORE: 18

## 2024-04-25 NOTE — PROGRESS NOTES
Brief Progress Note     Patient more uncomfortable. Is planning to try Fentanyl but may desire epidural eventually.     SVE:      A/P: 28 year old  at 20w6d here for induction termination in s/o fetal monosomy X on NIPT and multiple anomalies seen on imaging includig hydrops, L ventriculomegaly, FGR, cystic hygroma.   - SW consult   - S/p Mife; continue PV Miso   - Pain management per patient request. Potentially desires epidural.     Gris Roca MD   OBGYN resident PGY3  2024 2:19 PM

## 2024-04-25 NOTE — ANESTHESIA PREPROCEDURE EVALUATION
"Anesthesia Pre-Procedure Evaluation    Patient: Naty Joseph   MRN: [id number] : 1995        Procedure :           History reviewed. No pertinent past medical history.   History reviewed. No pertinent surgical history.   No Known Allergies   Social History     Tobacco Use    Smoking status: Never    Smokeless tobacco: Never   Substance Use Topics    Alcohol use: Not Currently      Wt Readings from Last 1 Encounters:   24 73.8 kg (162 lb 9.6 oz)        Anesthesia Evaluation   Pt has not had prior anesthetic     No history of anesthetic complications       ROS/MED HX  ENT/Pulmonary:  - neg pulmonary ROS     Neurologic:  - neg neurologic ROS     Cardiovascular:  - neg cardiovascular ROS     METS/Exercise Tolerance:     Hematologic:  - neg hematologic  ROS     Musculoskeletal:       GI/Hepatic:  - neg GI/hepatic ROS     Renal/Genitourinary:       Endo:  - neg endo ROS     Psychiatric/Substance Use:  - neg psychiatric ROS     Infectious Disease:       Malignancy:       Other:   IOT for fetal monosomy X         Physical Exam    Airway        Mallampati: I   TM distance: > 3 FB   Neck ROM: full   Mouth opening: > 3 cm    Respiratory Devices and Support         Dental  no notable dental history         Cardiovascular   cardiovascular exam normal          Pulmonary   pulmonary exam normal                OUTSIDE LABS:  CBC:   Lab Results   Component Value Date    WBC 8.4 2024    HGB 11.8 2024    HCT 34.3 (L) 2024     2024     BMP: No results found for: \"NA\", \"POTASSIUM\", \"CHLORIDE\", \"CO2\", \"BUN\", \"CR\", \"GLC\"  COAGS: No results found for: \"PTT\", \"INR\", \"FIBR\"  POC: No results found for: \"BGM\", \"HCG\", \"HCGS\"  HEPATIC: No results found for: \"ALBUMIN\", \"PROTTOTAL\", \"ALT\", \"AST\", \"GGT\", \"ALKPHOS\", \"BILITOTAL\", \"BILIDIRECT\", \"LIZABETH\"  OTHER: No results found for: \"PH\", \"LACT\", \"A1C\", \"NATE\", \"PHOS\", \"MAG\", \"LIPASE\", \"AMYLASE\", \"TSH\", \"T4\", \"T3\", \"CRP\", \"SED\"    Anesthesia Plan    ASA " Status:  2       Anesthesia Type: Epidural.              Consents    Anesthesia Plan(s) and associated risks, benefits, and realistic alternatives discussed. Questions answered and patient/representative(s) expressed understanding.     - Discussed:     - Discussed with:  Patient            Postoperative Care            Comments:               Watson Aguilera MD    I have reviewed the pertinent notes and labs in the chart from the past 30 days and (re)examined the patient.  Any updates or changes from those notes are reflected in this note.

## 2024-04-25 NOTE — PROGRESS NOTES
Brief Progress Note     Went in to meet patient.   Patient feeling comfortable. No concerns or questions at this time.     A/P: 28 year old  at 20w6d here for induction termination in s/o fetal monosomy X on NIPT and multiple anomalies seen on imaging includig hydrops, L ventriculomegaly, FGR, cystic hygroma.   - SW consult   - S/p Mife; continue PV Miso   - Pain management per patient request.     Gris Roca MD   OBGYN resident PGY3  2024 9:41 AM

## 2024-04-25 NOTE — PROGRESS NOTES
"Received order for SW to see related to patient's anticipated fetal loss.  SW met with Naty and Slade this afternoon to offer support.     Naty and Slade easily engage.  They started the conversation with gratitude for the strong supports they have in their life.  Naty's parents are here in town with them.  Slade's parents are back home in [place] and are caring for their 3 daughters.    Naty shares feelings of readiness for delivery.  The concrete things that needed to be done to get to this part of the journey have absorbed a lot of her energy.      Neither Naty or Slade chose to spend time with me processing their emotions but their love for this baby and their wish for her to not suffer in this world is clear.  They have given their baby girl the name \"Kimberley\" (they have not yet chosen spelling).   They have informed nursing they would like mementos.      Naty's mother has helped with the planning for private cremation for Corrina.  They have chosen a local  home,  [employer] in [facility] [phone] for these arrangements.  SW will contact the  home after Corrina's birth and death to provide preliminary information.      SW to follow up with family again tomorrow for supportive interventions.    GABRIEL [facility]  Clinical   Maternal Child Health  Voicemail:  [phone]  Reachable via Vocera    "

## 2024-04-25 NOTE — PROVIDER NOTIFICATION
04/25/24 0800   Provider Notification   Provider Name/Title Dr. coughlin   Method of Notification Electronic Page   Request Evaluate in Person   Notification Reason Patient Arrived       Dr. Coughlin notified of patient arrival for IOL.

## 2024-04-25 NOTE — PLAN OF CARE
Patient here for IOL. VSS; no EFM. Patient used nitrous initially for pain, just switched to IV fentanyl. Endorses more comfortable after fentanyl. Knows she can have more fentanyl or choose an epidural. Dr. Roca at the bedside for cervical exam. Anticipate .

## 2024-04-25 NOTE — H&P
"[facility]  OB History and Physical      Naty Joseph MRN# [id number]   Age: 28 year old YOB: 1995     HPI:  Naty Joseph is a 28 year old  at 20w6d who presents for induction termination in the setting of fetal monosomy X and multiple anomalies including hydrops, L ventriculomegaly, FGR, and cystic hygroma.  Here today with  and parents.      Pregnancy Complications:  - Fetal Monosomy X    Prenatal Labs:   No results found for: \"ABO\", \"RH\", \"AS\", \"HEPBANG\", \"CHPCRT\", \"GCPCRT\", \"TREPAB\", \"RPR\", \"RUBELLAABIGG\", \"HGB\", \"HIV\"    OB History  OB History    Para Term  AB Living   5 3 3 0 1 3   SAB IAB Ectopic Multiple Live Births   1 0 0 0 3      # Outcome Date GA Lbr [place]/2nd Weight Sex Type Anes PTL Lv   5 Current            4 Term 2021 41w0d    Vag-Spont   IVANNA   3 SAB 2020 8w0d          2 Term 2017     Vag-Spont   IVANNA   1 Term 2015     Vag-Spont   IVANNA       PMHx:  History reviewed. No pertinent past medical history.    PSHx:   History reviewed. No pertinent surgical history.    Meds:   Facility-Administered Medications Prior to Admission   Medication Dose Route Frequency Provider Last Rate Last Admin    [COMPLETED] miFEPRIStone (MIFEPREX) tablet 200 mg  200 mg Oral Once    200 mg at 24 1317     No medications prior to admission.       Allergies:  No Known Allergies     FmHx: History reviewed. No pertinent family history.    ROS:   Negative except as noted in HPI.     PE:  Vit: Patient Vitals for the past 4 hrs:   BP Temp Temp src Resp   24 0755 127/70 97.5  F (36.4  C) Oral 18      Gen: Well-appearing, NAD, comfortable   CV: Regular rate and well perfused  Pulm: Breathing comfortably on RA  Abd: Soft, gravid, non-tender    Assessment  Ms. Naty Joseph is a 28 year old  at 20w6d presenting for induction termination in the setting of fetal monosomy X and multiple anomalies including hydrops, L ventriculomegaly, FGR, " and cystic hygroma.    Plan    # IOT  - Discussed options for pain management including epidural, IV narcotics, nitrous oxide  - SW consultation placed; working with  home for private burial   - Considering genetics/autopsy; would like time to think on this further  - SVE PRN   - PV Misoprostol 400 mcg q3h  - s/p Mifepristone     Discussed with Dr Donavon Clark MD  Ob/Gyn PGY-2  24 8:34 AM    I have seen and examined the patient without the resident. I have reviewed, edited, and agree with the note.   My findings are:appears well but contractions ramping up already  Reviewed plan   Answered questions    Osiris Raphael MD

## 2024-04-25 NOTE — ANESTHESIA PROCEDURE NOTES
Epidural catheter Procedure Note    Pre-Procedure   Staff -        Anesthesiologist:  Luz Pierre MD       Resident/Fellow: Vitor Franks MD       Performed By: resident       Location: OB       Procedure Start/Stop Times: 4/25/2024 2:57 PM and 4/25/2024 3:31 PM       Pre-Anesthestic Checklist: patient identified, IV checked, risks and benefits discussed, informed consent, monitors and equipment checked, pre-op evaluation, at physician/surgeon's request and post-op pain management  Timeout:       Correct Patient: Yes        Correct Procedure: Yes        Correct Site: Yes        Correct Position: Yes   Procedure Documentation  Procedure: epidural catheter       Diagnosis: labor analgesia       Patient Position: sitting       Patient Prep/Sterile Barriers: sterile gloves, mask, patient draped       Skin prep: Chloraprep       Local skin infiltrated with 3 mL of 1% lidocaine.        Insertion Site: L3-4. (midline approach).       Technique: LORT saline and LORT air        FABIOLA at 5 cm.       Needle Type: ToTheFanLeaguey needle       Needle Gauge: 17.        Needle Length (Inches): 3.5        Catheter: 20 G.          Catheter threaded easily.         5 cm epidural space.         Threaded 10 cm at skin.         # of attempts: 1 and  # of redirects:  0    Assessment/Narrative         Paresthesias: No.       Test dose of 3 mL lidocaine 1.5% w/ 1:200,000 epinephrine at 15:23 CDT.         Test dose negative, 3 minutes after injection, for signs of intravascular, subdural, or intrathecal injection.       Insertion/Infusion Method: LORT saline and LORT air       Aspiration negative for Heme or CSF via Epidural Catheter.       Sensory Level Left: T7.       Sensory Level Right: T8.    Medication(s) Administered   0.125% bupivacaine 5 mL + fentanyl 20 mcg + NS 5 mL (Epidural) (Mixture components: BUPivacaine HCl (PF) 0.25 % Soln, 5 mL; fentaNYL (PF) 100 MCG/2ML Soln, 20 mcg; sodium chloride 0.9 % Soln, 5 mL) - EPIDURAL   10 mL -  "4/25/2024 3:28:00 PM  Medication Administration Time: 4/25/2024 2:57 PM      FOR [facility]) ONLY:   Pain Team Contact information: please page the Pain Team Via [employer]. Search \"Pain\". During daytime hours, please page the attending first. At night please page the resident first.      "

## 2024-04-25 NOTE — PROGRESS NOTES
24 1314   Child Life   Location [facility]   Interaction Intent Introduction of Services;Initial Assessment   Method in-person   Individuals Present Patient;Caregiver/Adult Family Member   Comments (names or other info) Patient, Spouse, Parents   Intervention Goal Complete Consult; Initial Assessment, EOL Suport   Intervention End of Life Care;Emotional Processing   End of Life Care Induction for termination of labor; 3 other children at home   Emotional Processing Supportive conversation and emotional conversation and navigation relating to termination of labor   Distress appropriate   Major Change/Loss/Stressor/Fears death of a loved one;traumatic event   Ability to Shift Focus From Distress easy   Outcomes/Follow Up Provided Materials   Outcomes Comment CCLS consulted by RN and SW to provide grief and bereavement support following induction for termination of labor and completed procedure.Upon entering room, patient present in bed using nebulizer. Partner, and parents present at bedside. Mom coping appropriately as evidence by becoming tearful when speaking about the procedure but able to converse with CCLS and returning to baseline following interaction. Per mom, she has 3 other children at home. Children at home are ages 8,7,2. Mom states she has told her children about the procedure and they had varying reactions. CCLS validated this and explained different grief responses by specific developmental ages. Per mom, the children would not be visiting the hospital and her 8 year old took it hardest. CCLS asked what questions mom has for having further conversations and navigating the loss with her children and their future coping. CCLS talked with mom about resources available through CFL and mom receptive. CF provided books on  loss, developmental responses to grief, and a workbook for the 8 year old to help with therapeutic expression and coping.  Mom stated she would like to review the resources when she was ready and would reach out if she has any other questions. CCLS will continue to support if additional needs arise.   Time Spent   Direct Patient Care 10   Indirect Patient Care 10   Total Time Spent (Calc) 20

## 2024-04-26 ENCOUNTER — APPOINTMENT (OUTPATIENT)
Dept: GENERAL RADIOLOGY | Facility: CLINIC | Age: 29
End: 2024-04-26
Attending: OBSTETRICS & GYNECOLOGY
Payer: COMMERCIAL

## 2024-04-26 ENCOUNTER — APPOINTMENT (OUTPATIENT)
Dept: GENERAL RADIOLOGY | Facility: CLINIC | Age: 29
End: 2024-04-26
Payer: COMMERCIAL

## 2024-04-26 LAB
ALBUMIN SERPL BCG-MCNC: 2.8 G/DL (ref 3.5–5.2)
ALBUMIN SERPL BCG-MCNC: 2.9 G/DL (ref 3.5–5.2)
ALBUMIN SERPL BCG-MCNC: 2.9 G/DL (ref 3.5–5.2)
ALLEN'S TEST: ABNORMAL
ALP SERPL-CCNC: 44 U/L (ref 40–150)
ALP SERPL-CCNC: 47 U/L (ref 40–150)
ALP SERPL-CCNC: 49 U/L (ref 40–150)
ALT SERPL W P-5'-P-CCNC: 16 U/L (ref 0–50)
ALT SERPL W P-5'-P-CCNC: 17 U/L (ref 0–50)
ALT SERPL W P-5'-P-CCNC: 18 U/L (ref 0–50)
ANION GAP SERPL CALCULATED.3IONS-SCNC: 10 MMOL/L (ref 7–15)
ANION GAP SERPL CALCULATED.3IONS-SCNC: 6 MMOL/L (ref 7–15)
ANION GAP SERPL CALCULATED.3IONS-SCNC: 7 MMOL/L (ref 7–15)
APTT PPP: 27 SECONDS (ref 22–38)
APTT PPP: 39 SECONDS (ref 22–38)
AST SERPL W P-5'-P-CCNC: 25 U/L (ref 0–45)
AST SERPL W P-5'-P-CCNC: 25 U/L (ref 0–45)
AST SERPL W P-5'-P-CCNC: 26 U/L (ref 0–45)
BASE EXCESS BLDA CALC-SCNC: -3.5 MMOL/L (ref -3–3)
BASE EXCESS BLDA CALC-SCNC: -6.5 MMOL/L (ref -3–3)
BASOPHILS # BLD AUTO: 0 10E3/UL (ref 0–0.2)
BASOPHILS # BLD AUTO: 0 10E3/UL (ref 0–0.2)
BASOPHILS NFR BLD AUTO: 0 %
BASOPHILS NFR BLD AUTO: 0 %
BILIRUB SERPL-MCNC: 1 MG/DL
BILIRUB SERPL-MCNC: 1.1 MG/DL
BILIRUB SERPL-MCNC: 1.4 MG/DL
BUN SERPL-MCNC: 4.6 MG/DL (ref 6–20)
BUN SERPL-MCNC: 5.4 MG/DL (ref 6–20)
BUN SERPL-MCNC: 6.2 MG/DL (ref 6–20)
CA-I BLD-MCNC: 4.5 MG/DL (ref 4.4–5.2)
CA-I BLD-MCNC: 5 MG/DL (ref 4.4–5.2)
CALCIUM SERPL-MCNC: 7.8 MG/DL (ref 8.6–10)
CALCIUM SERPL-MCNC: 8.3 MG/DL (ref 8.6–10)
CALCIUM SERPL-MCNC: 8.7 MG/DL (ref 8.6–10)
CHLORIDE SERPL-SCNC: 107 MMOL/L (ref 98–107)
CHLORIDE SERPL-SCNC: 108 MMOL/L (ref 98–107)
CHLORIDE SERPL-SCNC: 110 MMOL/L (ref 98–107)
COHGB MFR BLD: >100 % (ref 96–97)
CREAT SERPL-MCNC: 0.46 MG/DL (ref 0.51–0.95)
CREAT SERPL-MCNC: 0.51 MG/DL (ref 0.51–0.95)
CREAT SERPL-MCNC: 0.56 MG/DL (ref 0.51–0.95)
DEPRECATED HCO3 PLAS-SCNC: 20 MMOL/L (ref 22–29)
DEPRECATED HCO3 PLAS-SCNC: 24 MMOL/L (ref 22–29)
DEPRECATED HCO3 PLAS-SCNC: 26 MMOL/L (ref 22–29)
EGFRCR SERPLBLD CKD-EPI 2021: >90 ML/MIN/1.73M2
EOSINOPHIL # BLD AUTO: 0 10E3/UL (ref 0–0.7)
EOSINOPHIL # BLD AUTO: 0 10E3/UL (ref 0–0.7)
EOSINOPHIL NFR BLD AUTO: 0 %
EOSINOPHIL NFR BLD AUTO: 0 %
ERYTHROCYTE [DISTWIDTH] IN BLOOD BY AUTOMATED COUNT: 14.7 % (ref 10–15)
ERYTHROCYTE [DISTWIDTH] IN BLOOD BY AUTOMATED COUNT: 14.9 % (ref 10–15)
ERYTHROCYTE [DISTWIDTH] IN BLOOD BY AUTOMATED COUNT: 15.5 % (ref 10–15)
ERYTHROCYTE [DISTWIDTH] IN BLOOD BY AUTOMATED COUNT: 16 % (ref 10–15)
FIBRINOGEN PPP-MCNC: 323 MG/DL (ref 170–490)
FIBRINOGEN PPP-MCNC: 327 MG/DL (ref 170–490)
GLUCOSE BLD-MCNC: 132 MG/DL (ref 70–99)
GLUCOSE BLDC GLUCOMTR-MCNC: 100 MG/DL (ref 70–99)
GLUCOSE BLDC GLUCOMTR-MCNC: 109 MG/DL (ref 70–99)
GLUCOSE BLDC GLUCOMTR-MCNC: 71 MG/DL (ref 70–99)
GLUCOSE BLDC GLUCOMTR-MCNC: 82 MG/DL (ref 70–99)
GLUCOSE BLDC GLUCOMTR-MCNC: 82 MG/DL (ref 70–99)
GLUCOSE BLDC GLUCOMTR-MCNC: 88 MG/DL (ref 70–99)
GLUCOSE BLDC GLUCOMTR-MCNC: 97 MG/DL (ref 70–99)
GLUCOSE SERPL-MCNC: 106 MG/DL (ref 70–99)
GLUCOSE SERPL-MCNC: 78 MG/DL (ref 70–99)
GLUCOSE SERPL-MCNC: 98 MG/DL (ref 70–99)
HCO3 BLD-SCNC: 21 MMOL/L (ref 21–28)
HCO3 BLDA-SCNC: 19 MMOL/L (ref 21–28)
HCT VFR BLD AUTO: 24.3 % (ref 35–47)
HCT VFR BLD AUTO: 26.9 % (ref 35–47)
HCT VFR BLD AUTO: 27 % (ref 35–47)
HCT VFR BLD AUTO: 27.6 % (ref 35–47)
HGB BLD-MCNC: 7.8 G/DL (ref 11.7–15.7)
HGB BLD-MCNC: 8.1 G/DL (ref 11.7–15.7)
HGB BLD-MCNC: 8.3 G/DL (ref 11.7–15.7)
HGB BLD-MCNC: 8.3 G/DL (ref 11.7–15.7)
HGB BLD-MCNC: 8.8 G/DL (ref 11.7–15.7)
HGB BLD-MCNC: 9.1 G/DL (ref 11.7–15.7)
HGB BLD-MCNC: 9.1 G/DL (ref 11.7–15.7)
HGB BLD-MCNC: 9.4 G/DL (ref 11.7–15.7)
HOLD SPECIMEN: NORMAL
IMM GRANULOCYTES # BLD: 0 10E3/UL
IMM GRANULOCYTES # BLD: 0 10E3/UL
IMM GRANULOCYTES NFR BLD: 0 %
IMM GRANULOCYTES NFR BLD: 1 %
INR PPP: 1.12 (ref 0.85–1.15)
INR PPP: 1.22 (ref 0.85–1.15)
LACTATE BLD-SCNC: 2.1 MMOL/L (ref 0.7–2)
LACTATE SERPL-SCNC: 0.9 MMOL/L (ref 0.7–2)
LACTATE SERPL-SCNC: 1 MMOL/L (ref 0.7–2)
LACTATE SERPL-SCNC: 1.7 MMOL/L (ref 0.7–2)
LYMPHOCYTES # BLD AUTO: 1.3 10E3/UL (ref 0.8–5.3)
LYMPHOCYTES # BLD AUTO: 1.5 10E3/UL (ref 0.8–5.3)
LYMPHOCYTES NFR BLD AUTO: 19 %
LYMPHOCYTES NFR BLD AUTO: 19 %
MAGNESIUM SERPL-MCNC: 1.3 MG/DL (ref 1.7–2.3)
MAGNESIUM SERPL-MCNC: 1.9 MG/DL (ref 1.7–2.3)
MAGNESIUM SERPL-MCNC: 2.9 MG/DL (ref 1.7–2.3)
MCH RBC QN AUTO: 28.7 PG (ref 26.5–33)
MCH RBC QN AUTO: 28.8 PG (ref 26.5–33)
MCH RBC QN AUTO: 28.9 PG (ref 26.5–33)
MCH RBC QN AUTO: 28.9 PG (ref 26.5–33)
MCHC RBC AUTO-ENTMCNC: 32.6 G/DL (ref 31.5–36.5)
MCHC RBC AUTO-ENTMCNC: 33.8 G/DL (ref 31.5–36.5)
MCHC RBC AUTO-ENTMCNC: 34.1 G/DL (ref 31.5–36.5)
MCHC RBC AUTO-ENTMCNC: 34.2 G/DL (ref 31.5–36.5)
MCV RBC AUTO: 85 FL (ref 78–100)
MCV RBC AUTO: 88 FL (ref 78–100)
MONOCYTES # BLD AUTO: 0.5 10E3/UL (ref 0–1.3)
MONOCYTES # BLD AUTO: 0.6 10E3/UL (ref 0–1.3)
MONOCYTES NFR BLD AUTO: 7 %
MONOCYTES NFR BLD AUTO: 8 %
NEUTROPHILS # BLD AUTO: 5.2 10E3/UL (ref 1.6–8.3)
NEUTROPHILS # BLD AUTO: 5.6 10E3/UL (ref 1.6–8.3)
NEUTROPHILS NFR BLD AUTO: 72 %
NEUTROPHILS NFR BLD AUTO: 74 %
NRBC # BLD AUTO: 0 10E3/UL
NRBC # BLD AUTO: 0 10E3/UL
NRBC BLD AUTO-RTO: 0 /100
NRBC BLD AUTO-RTO: 0 /100
O2/TOTAL GAS SETTING VFR VENT: 38 %
O2/TOTAL GAS SETTING VFR VENT: 40 %
OXYHGB MFR BLDA: 98 % (ref 92–100)
PCO2 BLD: 35 MM HG (ref 35–45)
PCO2 BLDA: 35 MM HG (ref 35–45)
PEEP: 6 CM H2O
PH BLD: 7.39 [PH] (ref 7.35–7.45)
PH BLDA: 7.34 [PH] (ref 7.35–7.45)
PHOSPHATE SERPL-MCNC: 3.6 MG/DL (ref 2.5–4.5)
PHOSPHATE SERPL-MCNC: 4.5 MG/DL (ref 2.5–4.5)
PHOSPHATE SERPL-MCNC: 4.9 MG/DL (ref 2.5–4.5)
PLATELET # BLD AUTO: 103 10E3/UL (ref 150–450)
PLATELET # BLD AUTO: 108 10E3/UL (ref 150–450)
PLATELET # BLD AUTO: 77 10E3/UL (ref 150–450)
PLATELET # BLD AUTO: 80 10E3/UL (ref 150–450)
PO2 BLD: 206 MM HG (ref 80–105)
PO2 BLDA: 233 MM HG (ref 80–105)
POTASSIUM BLD-SCNC: 4.2 MMOL/L (ref 3.4–5.3)
POTASSIUM SERPL-SCNC: 3.4 MMOL/L (ref 3.4–5.3)
POTASSIUM SERPL-SCNC: 3.7 MMOL/L (ref 3.4–5.3)
POTASSIUM SERPL-SCNC: 3.9 MMOL/L (ref 3.4–5.3)
PROT SERPL-MCNC: 4.6 G/DL (ref 6.4–8.3)
PROT SERPL-MCNC: 4.7 G/DL (ref 6.4–8.3)
PROT SERPL-MCNC: 4.9 G/DL (ref 6.4–8.3)
RADIOLOGIST FLAGS: NORMAL
RBC # BLD AUTO: 2.87 10E6/UL (ref 3.8–5.2)
RBC # BLD AUTO: 3.07 10E6/UL (ref 3.8–5.2)
RBC # BLD AUTO: 3.15 10E6/UL (ref 3.8–5.2)
RBC # BLD AUTO: 3.26 10E6/UL (ref 3.8–5.2)
SAO2 % BLDA: 98 % (ref 92–100)
SAO2 % BLDA: >100 % (ref 96–97)
SODIUM BLD-SCNC: 135 MMOL/L (ref 135–145)
SODIUM SERPL-SCNC: 137 MMOL/L (ref 135–145)
SODIUM SERPL-SCNC: 139 MMOL/L (ref 135–145)
SODIUM SERPL-SCNC: 142 MMOL/L (ref 135–145)
WBC # BLD AUTO: 7.1 10E3/UL (ref 4–11)
WBC # BLD AUTO: 7.5 10E3/UL (ref 4–11)
WBC # BLD AUTO: 7.7 10E3/UL (ref 4–11)
WBC # BLD AUTO: 9.3 10E3/UL (ref 4–11)

## 2024-04-26 PROCEDURE — 999N000063 XR ABDOMEN PORT 1 VIEW

## 2024-04-26 PROCEDURE — 85730 THROMBOPLASTIN TIME PARTIAL: CPT

## 2024-04-26 PROCEDURE — 85014 HEMATOCRIT: CPT | Performed by: OBSTETRICS & GYNECOLOGY

## 2024-04-26 PROCEDURE — 84450 TRANSFERASE (AST) (SGOT): CPT

## 2024-04-26 PROCEDURE — 83605 ASSAY OF LACTIC ACID: CPT

## 2024-04-26 PROCEDURE — 250N000013 HC RX MED GY IP 250 OP 250 PS 637

## 2024-04-26 PROCEDURE — 85610 PROTHROMBIN TIME: CPT

## 2024-04-26 PROCEDURE — 250N000011 HC RX IP 250 OP 636

## 2024-04-26 PROCEDURE — 84450 TRANSFERASE (AST) (SGOT): CPT | Performed by: OBSTETRICS & GYNECOLOGY

## 2024-04-26 PROCEDURE — 80069 RENAL FUNCTION PANEL: CPT

## 2024-04-26 PROCEDURE — 74018 RADEX ABDOMEN 1 VIEW: CPT | Mod: 26 | Performed by: STUDENT IN AN ORGANIZED HEALTH CARE EDUCATION/TRAINING PROGRAM

## 2024-04-26 PROCEDURE — 250N000009 HC RX 250

## 2024-04-26 PROCEDURE — 82805 BLOOD GASES W/O2 SATURATION: CPT

## 2024-04-26 PROCEDURE — 82330 ASSAY OF CALCIUM: CPT

## 2024-04-26 PROCEDURE — 36415 COLL VENOUS BLD VENIPUNCTURE: CPT | Performed by: OBSTETRICS & GYNECOLOGY

## 2024-04-26 PROCEDURE — 999N000129 HC STATISTIC PICC/MID LINE PROC CANCELLED SUBQ WORKUP

## 2024-04-26 PROCEDURE — 84155 ASSAY OF PROTEIN SERUM: CPT | Performed by: OBSTETRICS & GYNECOLOGY

## 2024-04-26 PROCEDURE — 999N000157 HC STATISTIC RCP TIME EA 10 MIN

## 2024-04-26 PROCEDURE — 83735 ASSAY OF MAGNESIUM: CPT

## 2024-04-26 PROCEDURE — 83735 ASSAY OF MAGNESIUM: CPT | Performed by: OBSTETRICS & GYNECOLOGY

## 2024-04-26 PROCEDURE — 258N000003 HC RX IP 258 OP 636

## 2024-04-26 PROCEDURE — 84100 ASSAY OF PHOSPHORUS: CPT

## 2024-04-26 PROCEDURE — 85025 COMPLETE CBC W/AUTO DIFF WBC: CPT

## 2024-04-26 PROCEDURE — 85384 FIBRINOGEN ACTIVITY: CPT

## 2024-04-26 PROCEDURE — 99291 CRITICAL CARE FIRST HOUR: CPT | Mod: 24

## 2024-04-26 PROCEDURE — 85018 HEMOGLOBIN: CPT

## 2024-04-26 PROCEDURE — 250N000013 HC RX MED GY IP 250 OP 250 PS 637: Performed by: OBSTETRICS & GYNECOLOGY

## 2024-04-26 PROCEDURE — 3E043XZ INTRODUCTION OF VASOPRESSOR INTO CENTRAL VEIN, PERCUTANEOUS APPROACH: ICD-10-PCS | Performed by: OBSTETRICS & GYNECOLOGY

## 2024-04-26 PROCEDURE — 99232 SBSQ HOSP IP/OBS MODERATE 35: CPT | Mod: GC | Performed by: OBSTETRICS & GYNECOLOGY

## 2024-04-26 PROCEDURE — 36415 COLL VENOUS BLD VENIPUNCTURE: CPT

## 2024-04-26 PROCEDURE — 94002 VENT MGMT INPAT INIT DAY: CPT

## 2024-04-26 PROCEDURE — 999N000015 HC STATISTIC ARTERIAL MONITORING DAILY

## 2024-04-26 PROCEDURE — 999N000065 XR CHEST PORT 1 VIEW

## 2024-04-26 PROCEDURE — 84100 ASSAY OF PHOSPHORUS: CPT | Performed by: OBSTETRICS & GYNECOLOGY

## 2024-04-26 PROCEDURE — 71045 X-RAY EXAM CHEST 1 VIEW: CPT | Mod: 26 | Performed by: STUDENT IN AN ORGANIZED HEALTH CARE EDUCATION/TRAINING PROGRAM

## 2024-04-26 PROCEDURE — 200N000002 HC R&B ICU UMMC

## 2024-04-26 PROCEDURE — 83605 ASSAY OF LACTIC ACID: CPT | Performed by: OBSTETRICS & GYNECOLOGY

## 2024-04-26 PROCEDURE — 99252 IP/OBS CONSLTJ NEW/EST SF 35: CPT | Mod: 24

## 2024-04-26 RX ORDER — AMOXICILLIN 250 MG
2 CAPSULE ORAL 2 TIMES DAILY
Status: DISCONTINUED | OUTPATIENT
Start: 2024-04-26 | End: 2024-04-26

## 2024-04-26 RX ORDER — ENOXAPARIN SODIUM 100 MG/ML
40 INJECTION SUBCUTANEOUS EVERY 24 HOURS
Status: CANCELLED | OUTPATIENT
Start: 2024-04-26

## 2024-04-26 RX ORDER — NICOTINE POLACRILEX 4 MG
15-30 LOZENGE BUCCAL
Status: DISCONTINUED | OUTPATIENT
Start: 2024-04-26 | End: 2024-04-29 | Stop reason: HOSPADM

## 2024-04-26 RX ORDER — ONDANSETRON 4 MG/1
4 TABLET, ORALLY DISINTEGRATING ORAL EVERY 6 HOURS PRN
Status: CANCELLED | OUTPATIENT
Start: 2024-04-26

## 2024-04-26 RX ORDER — FENTANYL CITRATE 50 UG/ML
100 INJECTION, SOLUTION INTRAMUSCULAR; INTRAVENOUS ONCE
Status: COMPLETED | OUTPATIENT
Start: 2024-04-26 | End: 2024-04-26

## 2024-04-26 RX ORDER — PHENYLEPHRINE HCL IN 0.9% NACL 50MG/250ML
.5-1.25 PLASTIC BAG, INJECTION (ML) INTRAVENOUS CONTINUOUS
Status: DISCONTINUED | OUTPATIENT
Start: 2024-04-26 | End: 2024-04-27

## 2024-04-26 RX ORDER — ACETAMINOPHEN 325 MG/1
650 TABLET ORAL EVERY 6 HOURS
Status: CANCELLED | OUTPATIENT
Start: 2024-04-29

## 2024-04-26 RX ORDER — AMOXICILLIN 250 MG
1 CAPSULE ORAL 2 TIMES DAILY
Status: DISCONTINUED | OUTPATIENT
Start: 2024-04-26 | End: 2024-04-26

## 2024-04-26 RX ORDER — NALOXONE HYDROCHLORIDE 0.4 MG/ML
0.4 INJECTION, SOLUTION INTRAMUSCULAR; INTRAVENOUS; SUBCUTANEOUS
Status: DISCONTINUED | OUTPATIENT
Start: 2024-04-26 | End: 2024-04-29 | Stop reason: HOSPADM

## 2024-04-26 RX ORDER — CHLORHEXIDINE GLUCONATE ORAL RINSE 1.2 MG/ML
15 SOLUTION DENTAL EVERY 12 HOURS
Status: DISCONTINUED | OUTPATIENT
Start: 2024-04-26 | End: 2024-04-26

## 2024-04-26 RX ORDER — AMOXICILLIN 250 MG
1 CAPSULE ORAL 2 TIMES DAILY
Status: CANCELLED | OUTPATIENT
Start: 2024-04-26

## 2024-04-26 RX ORDER — ONDANSETRON 2 MG/ML
4 INJECTION INTRAMUSCULAR; INTRAVENOUS EVERY 6 HOURS PRN
Status: CANCELLED | OUTPATIENT
Start: 2024-04-26

## 2024-04-26 RX ORDER — ENOXAPARIN SODIUM 100 MG/ML
40 INJECTION SUBCUTANEOUS EVERY 24 HOURS
Status: DISCONTINUED | OUTPATIENT
Start: 2024-04-26 | End: 2024-04-29 | Stop reason: HOSPADM

## 2024-04-26 RX ORDER — KETOROLAC TROMETHAMINE 30 MG/ML
15 INJECTION, SOLUTION INTRAMUSCULAR; INTRAVENOUS EVERY 6 HOURS
Status: CANCELLED | OUTPATIENT
Start: 2024-04-26 | End: 2024-05-01

## 2024-04-26 RX ORDER — OXYCODONE HYDROCHLORIDE 5 MG/1
5 TABLET ORAL EVERY 4 HOURS PRN
Status: CANCELLED | OUTPATIENT
Start: 2024-04-26

## 2024-04-26 RX ORDER — OXYCODONE HYDROCHLORIDE 5 MG/1
10 TABLET ORAL EVERY 4 HOURS PRN
Status: CANCELLED | OUTPATIENT
Start: 2024-04-26

## 2024-04-26 RX ORDER — ONDANSETRON 4 MG/1
4 TABLET, ORALLY DISINTEGRATING ORAL EVERY 6 HOURS PRN
Status: DISCONTINUED | OUTPATIENT
Start: 2024-04-26 | End: 2024-04-29 | Stop reason: HOSPADM

## 2024-04-26 RX ORDER — NALOXONE HYDROCHLORIDE 0.4 MG/ML
0.2 INJECTION, SOLUTION INTRAMUSCULAR; INTRAVENOUS; SUBCUTANEOUS
Status: DISCONTINUED | OUTPATIENT
Start: 2024-04-26 | End: 2024-04-29 | Stop reason: HOSPADM

## 2024-04-26 RX ORDER — PROCHLORPERAZINE MALEATE 10 MG
10 TABLET ORAL EVERY 6 HOURS PRN
Status: CANCELLED | OUTPATIENT
Start: 2024-04-26

## 2024-04-26 RX ORDER — PROCHLORPERAZINE 25 MG
25 SUPPOSITORY, RECTAL RECTAL EVERY 12 HOURS PRN
Status: DISCONTINUED | OUTPATIENT
Start: 2024-04-26 | End: 2024-04-29 | Stop reason: HOSPADM

## 2024-04-26 RX ORDER — HYDROMORPHONE HCL IN WATER/PF 6 MG/30 ML
0.2 PATIENT CONTROLLED ANALGESIA SYRINGE INTRAVENOUS
Status: CANCELLED | OUTPATIENT
Start: 2024-04-26

## 2024-04-26 RX ORDER — LIDOCAINE 40 MG/G
CREAM TOPICAL
Status: CANCELLED | OUTPATIENT
Start: 2024-04-26

## 2024-04-26 RX ORDER — PROCHLORPERAZINE MALEATE 10 MG
10 TABLET ORAL EVERY 6 HOURS PRN
Status: DISCONTINUED | OUTPATIENT
Start: 2024-04-26 | End: 2024-04-29 | Stop reason: HOSPADM

## 2024-04-26 RX ORDER — IBUPROFEN 800 MG/1
800 TABLET, FILM COATED ORAL EVERY 6 HOURS
Status: CANCELLED | OUTPATIENT
Start: 2024-04-26 | End: 2024-05-01

## 2024-04-26 RX ORDER — LIDOCAINE 40 MG/G
CREAM TOPICAL
Status: DISCONTINUED | OUTPATIENT
Start: 2024-04-26 | End: 2024-04-29 | Stop reason: HOSPADM

## 2024-04-26 RX ORDER — BISACODYL 10 MG
10 SUPPOSITORY, RECTAL RECTAL DAILY PRN
Status: DISCONTINUED | OUTPATIENT
Start: 2024-04-26 | End: 2024-04-29 | Stop reason: HOSPADM

## 2024-04-26 RX ORDER — MAGNESIUM SULFATE HEPTAHYDRATE 40 MG/ML
4 INJECTION, SOLUTION INTRAVENOUS ONCE
Status: COMPLETED | OUTPATIENT
Start: 2024-04-26 | End: 2024-04-26

## 2024-04-26 RX ORDER — ONDANSETRON 2 MG/ML
4 INJECTION INTRAMUSCULAR; INTRAVENOUS EVERY 6 HOURS PRN
Status: DISCONTINUED | OUTPATIENT
Start: 2024-04-26 | End: 2024-04-29 | Stop reason: HOSPADM

## 2024-04-26 RX ORDER — OXYCODONE HYDROCHLORIDE 5 MG/1
5 TABLET ORAL EVERY 4 HOURS PRN
Status: DISCONTINUED | OUTPATIENT
Start: 2024-04-26 | End: 2024-04-27

## 2024-04-26 RX ORDER — POLYETHYLENE GLYCOL 3350 17 G/17G
17 POWDER, FOR SOLUTION ORAL DAILY
Status: DISCONTINUED | OUTPATIENT
Start: 2024-04-26 | End: 2024-04-29 | Stop reason: HOSPADM

## 2024-04-26 RX ORDER — HYDROMORPHONE HYDROCHLORIDE 1 MG/ML
0.3 INJECTION, SOLUTION INTRAMUSCULAR; INTRAVENOUS; SUBCUTANEOUS
Status: DISCONTINUED | OUTPATIENT
Start: 2024-04-26 | End: 2024-04-27

## 2024-04-26 RX ORDER — HYDROMORPHONE HCL IN WATER/PF 6 MG/30 ML
0.4 PATIENT CONTROLLED ANALGESIA SYRINGE INTRAVENOUS
Status: CANCELLED | OUTPATIENT
Start: 2024-04-26

## 2024-04-26 RX ORDER — FENTANYL CITRATE 50 UG/ML
INJECTION, SOLUTION INTRAMUSCULAR; INTRAVENOUS
Status: COMPLETED
Start: 2024-04-26 | End: 2024-04-26

## 2024-04-26 RX ORDER — DEXTROSE MONOHYDRATE 25 G/50ML
25-50 INJECTION, SOLUTION INTRAVENOUS
Status: DISCONTINUED | OUTPATIENT
Start: 2024-04-26 | End: 2024-04-29 | Stop reason: HOSPADM

## 2024-04-26 RX ORDER — ACETAMINOPHEN 325 MG/1
975 TABLET ORAL EVERY 6 HOURS
Status: CANCELLED | OUTPATIENT
Start: 2024-04-26 | End: 2024-04-29

## 2024-04-26 RX ORDER — PROPOFOL 10 MG/ML
5-75 INJECTION, EMULSION INTRAVENOUS CONTINUOUS
Status: DISCONTINUED | OUTPATIENT
Start: 2024-04-26 | End: 2024-04-26

## 2024-04-26 RX ORDER — BISACODYL 10 MG
10 SUPPOSITORY, RECTAL RECTAL DAILY PRN
Status: CANCELLED | OUTPATIENT
Start: 2024-04-26

## 2024-04-26 RX ADMIN — Medication 10 MG: at 01:37

## 2024-04-26 RX ADMIN — OXYCODONE HYDROCHLORIDE 5 MG: 5 TABLET ORAL at 12:12

## 2024-04-26 RX ADMIN — MAGNESIUM SULFATE HEPTAHYDRATE 4 G: 40 INJECTION, SOLUTION INTRAVENOUS at 04:12

## 2024-04-26 RX ADMIN — OXYCODONE HYDROCHLORIDE 5 MG: 5 TABLET ORAL at 16:19

## 2024-04-26 RX ADMIN — FENTANYL CITRATE 100 MCG: 50 INJECTION INTRAMUSCULAR; INTRAVENOUS at 00:58

## 2024-04-26 RX ADMIN — Medication 10 MG: at 01:03

## 2024-04-26 RX ADMIN — CHLORHEXIDINE GLUCONATE 15 ML: 1.2 SOLUTION ORAL at 08:20

## 2024-04-26 RX ADMIN — Medication 0.5 MCG/KG/MIN: at 02:20

## 2024-04-26 RX ADMIN — SODIUM CHLORIDE, POTASSIUM CHLORIDE, SODIUM LACTATE AND CALCIUM CHLORIDE 500 ML: 600; 310; 30; 20 INJECTION, SOLUTION INTRAVENOUS at 11:03

## 2024-04-26 RX ADMIN — ENOXAPARIN SODIUM 40 MG: 40 INJECTION SUBCUTANEOUS at 16:19

## 2024-04-26 RX ADMIN — SUGAMMADEX 200 MG: 100 INJECTION, SOLUTION INTRAVENOUS at 00:40

## 2024-04-26 RX ADMIN — Medication 40 MG: at 08:20

## 2024-04-26 RX ADMIN — ACETAMINOPHEN 650 MG: 325 TABLET, FILM COATED ORAL at 11:33

## 2024-04-26 RX ADMIN — KETOROLAC TROMETHAMINE 30 MG: 30 INJECTION, SOLUTION INTRAMUSCULAR at 10:18

## 2024-04-26 RX ADMIN — HYDROMORPHONE HYDROCHLORIDE 0.3 MG: 1 INJECTION, SOLUTION INTRAMUSCULAR; INTRAVENOUS; SUBCUTANEOUS at 14:41

## 2024-04-26 RX ADMIN — Medication: at 02:44

## 2024-04-26 RX ADMIN — SODIUM CHLORIDE, POTASSIUM CHLORIDE, SODIUM LACTATE AND CALCIUM CHLORIDE: 600; 310; 30; 20 INJECTION, SOLUTION INTRAVENOUS at 10:17

## 2024-04-26 RX ADMIN — POLYETHYLENE GLYCOL 3350 17 G: 17 POWDER, FOR SOLUTION ORAL at 08:20

## 2024-04-26 RX ADMIN — SODIUM CHLORIDE, POTASSIUM CHLORIDE, SODIUM LACTATE AND CALCIUM CHLORIDE: 600; 310; 30; 20 INJECTION, SOLUTION INTRAVENOUS at 01:13

## 2024-04-26 RX ADMIN — OXYCODONE HYDROCHLORIDE 5 MG: 5 TABLET ORAL at 20:56

## 2024-04-26 RX ADMIN — FENTANYL CITRATE 100 MCG: 50 INJECTION, SOLUTION INTRAMUSCULAR; INTRAVENOUS at 00:58

## 2024-04-26 RX ADMIN — SODIUM CHLORIDE, POTASSIUM CHLORIDE, SODIUM LACTATE AND CALCIUM CHLORIDE 500 ML: 600; 310; 30; 20 INJECTION, SOLUTION INTRAVENOUS at 09:08

## 2024-04-26 RX ADMIN — HYDROMORPHONE HYDROCHLORIDE 0.3 MG: 1 INJECTION, SOLUTION INTRAMUSCULAR; INTRAVENOUS; SUBCUTANEOUS at 23:54

## 2024-04-26 RX ADMIN — PROPOFOL 30 MCG/KG/MIN: 10 INJECTION, EMULSION INTRAVENOUS at 01:08

## 2024-04-26 RX ADMIN — PROPOFOL 45 MCG/KG/MIN: 10 INJECTION, EMULSION INTRAVENOUS at 04:46

## 2024-04-26 RX ADMIN — ACETAMINOPHEN 650 MG: 325 TABLET, FILM COATED ORAL at 16:19

## 2024-04-26 ASSESSMENT — ACTIVITIES OF DAILY LIVING (ADL)
ADLS_ACUITY_SCORE: 23
ADLS_ACUITY_SCORE: 18
ADLS_ACUITY_SCORE: 23
ADLS_ACUITY_SCORE: 23
ADLS_ACUITY_SCORE: 28
ADLS_ACUITY_SCORE: 23
ADLS_ACUITY_SCORE: 28
ADLS_ACUITY_SCORE: 18
ADLS_ACUITY_SCORE: 26
ADLS_ACUITY_SCORE: 26
ADLS_ACUITY_SCORE: 28
ADLS_ACUITY_SCORE: 23
ADLS_ACUITY_SCORE: 23
ADLS_ACUITY_SCORE: 24
ADLS_ACUITY_SCORE: 23
ADLS_ACUITY_SCORE: 28
ADLS_ACUITY_SCORE: 28
ADLS_ACUITY_SCORE: 24
ADLS_ACUITY_SCORE: 24
ADLS_ACUITY_SCORE: 28
ADLS_ACUITY_SCORE: 23

## 2024-04-26 NOTE — PLAN OF CARE
Goal Outcome Evaluation:  Delivery fetal demise (termination) baby girl at 1847. Pt not doing very well.  Temp 101.4 oral,  Denies chest pain, shortness of breath, nausea or vomiting.  Vaginal bleeding is heavier with large and small clots (SEE post partum QBL flowsheet)  Dr Raphael at bedside assessing. Uterotonic meds given (SEE MAR). Still bleeding. Pt consented for D&C.

## 2024-04-26 NOTE — PROGRESS NOTES
[facility]   Post-partum Progress Note    Name:  Naty Joseph  MRN: [id number]    S: Patient shakes head no when asked about pain. Currently intubated and sedated in the ICU. Per nursing, stable overnight. Required some increased sedation and subsequent low bps requiring low rate phenylephrine. Plan to extubate this morning.      O:   Patient Vitals for the past 24 hrs:   BP Temp Temp src Pulse Resp SpO2   04/26/24 0800 106/50 98.1  F (36.7  C) Axillary 52 15 98 %   04/26/24 0745 106/61 -- -- 55 15 100 %   04/26/24 0740 106/57 -- -- 75 14 96 %   04/26/24 0730 103/43 -- -- 53 15 98 %   04/26/24 0715 102/47 -- -- 55 15 99 %   04/26/24 0700 107/47 -- -- 58 15 99 %   04/26/24 0645 103/50 -- -- 58 15 100 %   04/26/24 0630 105/51 -- -- 75 16 100 %   04/26/24 0615 105/74 -- -- 71 16 98 %   04/26/24 0600 109/59 -- -- 61 15 99 %   04/26/24 0545 106/52 -- -- 58 15 99 %   04/26/24 0530 104/57 -- -- 56 15 99 %   04/26/24 0515 106/51 -- -- 55 15 99 %   04/26/24 0500 105/53 -- -- 53 15 99 %   04/26/24 0445 114/50 -- -- 87 22 100 %   04/26/24 0430 102/53 -- -- 51 15 100 %   04/26/24 0415 111/56 -- -- 86 15 100 %   04/26/24 0405 -- -- -- -- -- 100 %   04/26/24 0400 98/48 97.8  F (36.6  C) Axillary 57 15 100 %   04/26/24 0300 105/57 -- -- 60 15 100 %   04/26/24 0200 94/43 -- -- 93 13 100 %   04/26/24 0145 98/59 -- -- 96 16 100 %   04/26/24 0130 -- -- -- 102 14 98 %   04/26/24 0115 97/54 -- -- 88 14 100 %   04/26/24 0100 (!) 143/100 (!) 96.7  F (35.9  C) Axillary (!) 141 16 --   04/26/24 0050 -- -- -- -- -- 99 %   04/26/24 0045 -- -- -- 69 16 100 %   04/25/24 2041 -- -- -- -- -- 98 %   04/25/24 2038 109/53 -- -- -- -- --   04/25/24 2023 104/57 -- -- -- -- --   04/25/24 2016 -- (!) 101.4  F (38.6  C) Oral -- -- 98 %   04/25/24 2008 100/45 -- -- -- -- --   04/25/24 1953 104/54 -- -- -- -- --   04/25/24 1947 105/49 -- -- -- -- 99 %   04/25/24 1853 110/55 -- -- -- -- 100 %   04/25/24 1838 105/54 -- -- -- --  100 %   04/25/24 1823 105/50 -- -- -- -- 98 %   04/25/24 1808 103/52 -- -- -- -- 99 %   04/25/24 1753 100/50 -- -- -- -- --   04/25/24 1746 -- (!) 100.8  F (38.2  C) Oral -- 24 99 %   04/25/24 1741 -- -- -- -- -- 98 %   04/25/24 1737 100/52 -- -- -- -- 98 %   04/25/24 1732 92/54 -- -- -- -- 98 %   04/25/24 1727 95/52 -- -- -- -- 100 %   04/25/24 1722 95/55 -- -- -- -- 100 %   04/25/24 1717 95/46 -- -- -- -- 99 %   04/25/24 1712 97/48 -- -- -- -- 98 %   04/25/24 1707 97/55 -- -- -- -- 98 %   04/25/24 1702 100/50 -- -- -- -- 98 %   04/25/24 1657 98/52 -- -- -- -- --   04/25/24 1651 101/52 -- -- -- -- --   04/25/24 1649 98/47 -- -- -- -- --   04/25/24 1647 93/48 -- -- -- -- --   04/25/24 1645 92/53 -- -- -- -- 98 %   04/25/24 1643 90/50 -- -- -- -- --   04/25/24 1641 100/51 -- -- -- -- 98 %   04/25/24 1639 100/50 -- -- -- -- --   04/25/24 1636 91/49 -- -- -- -- 97 %   04/25/24 1632 97/49 -- -- -- -- --   04/25/24 1626 93/49 -- -- -- -- 98 %   04/25/24 1622 92/46 -- -- -- -- 99 %   04/25/24 1616 103/51 -- -- -- -- 99 %   04/25/24 1614 97/51 -- -- -- -- 99 %   04/25/24 1612 92/55 -- -- -- -- 99 %   04/25/24 1610 (!) 88/58 -- -- -- -- 99 %   04/25/24 1608 (!) 89/53 -- -- -- -- 98 %   04/25/24 1607 96/50 -- -- -- -- 99 %   04/25/24 1601 93/53 -- -- -- -- 99 %   04/25/24 1600 91/51 -- -- -- -- --   04/25/24 1558 (!) 89/50 -- -- -- -- 98 %   04/25/24 1556 94/53 -- -- -- -- 98 %   04/25/24 1554 97/53 -- -- -- -- 98 %   04/25/24 1552 95/51 -- -- -- -- 98 %   04/25/24 1550 94/54 -- -- -- -- 98 %   04/25/24 1548 91/48 -- -- -- -- --   04/25/24 1546 (!) 89/50 -- -- -- -- --   04/25/24 1543 (!) 84/49 -- -- -- -- 100 %   04/25/24 1542 90/41 -- -- -- -- 100 %   04/25/24 1538 92/52 -- -- -- -- 100 %   04/25/24 1534 97/54 -- -- -- -- 100 %   04/25/24 1532 96/55 -- -- -- -- 100 %   04/25/24 1530 (!) 86/50 -- -- -- -- 100 %   04/25/24 1528 102/55 -- -- -- -- 100 %   04/25/24 1522 103/57 -- -- -- -- (P) 100 %   04/25/24 1506 -- -- -- --  -- 100 %   24 1200 119/54 97.5  F (36.4  C) Oral -- 18 --     Gen:  Resting comfortably, NAD  CV:  RR, well perfused  Pulm:  Non-labored breathing on room air  Abd:  Soft, non-distended   Incision: VML incision covered in clean bandage   Ext:  Trace edema, SCDs in place     UOP: 1525 since midnight      Latest Reference Range & Units 24 23:53 24 01:20 24 06:30   WBC 4.0 - 11.0 10e3/uL 9.3 7.5 7.7   Hemoglobin 11.7 - 15.7 g/dL 8.8 (L) 9.1 (L)  9.1 (L) 9.4 (L)   Hematocrit 35.0 - 47.0 % 27.0 (L) 26.9 (L) 27.6 (L)   Platelet Count 150 - 450 10e3/uL 80 (L) 77 (L) 108 (L)   RBC Count 3.80 - 5.20 10e6/uL 3.07 (L) 3.15 (L) 3.26 (L)   MCV 78 - 100 fL 88 85 85   MCH 26.5 - 33.0 pg 28.7 28.9 28.8   MCHC 31.5 - 36.5 g/dL 32.6 33.8 34.1   RDW 10.0 - 15.0 % 14.7 14.9 15.5 (H)       Assessment/Plan:  Naty Joseph 28 year old  on PPD#1 s/p  that was complicated by postpartum hemorrhage with DIC ultimately resulting in total abdominal hysterectomy after which patient was admitted to ICU. Per ICU plan to extubate this morning.     # DIC  # Postpartum hemorrhage  Induction uncomplicated and required only 3 doses of vaginal misoprostol. Subsequent hemorrhage with DIC of unclear etiology though possible that this could represent mirror syndrome in the setting of fetal hydrops. Currently stable in the ICU with stabilization of labs. On examination this morning, abdomen non-distended and minimal vaginal spotting overnight. VSS  - Hgb stable at 9.4, plts stable at 108; fibrinogen and INR have normalized.   - Total EBL: 3227. Deliv EBL 1222 (hema, TXA, mthg, miso) >   - Total prods: 5u pRBC, 5u Cryo, 4u FFP, 1u Plts    # Post-delivery management   Pain: Currently with epidural in place. Recommend removal this morning following intubation. Discussed with anesthesia. Able to start Lovenox 4 hour post-removal though will ensure stable from a hemorrhage perspective prior to starting Lovenox. Following  epidural removal, recommend Tylenol, Ibuprofen, PRN oxycodone/IV dilaudid   GI:  Currently with NG tube in place; PRN antiemetics/stool softeners   : Prescott in place; UOP adequate   Rh: Positive  PPx:  Lovenox likely this afternoon if continues to be stable     Appreciate ICU cares. Obgyn will continue to follow closely.     Dispo: Medically Ready for Discharge: Anticipated in 2-4 Days    Gris Roca MD   OBGYN resident PGY3  04/26/2024 8:57 AM

## 2024-04-26 NOTE — PROGRESS NOTES
Called again for bleeding  EBL 1100  INR 1.23   Cryo ordered  To OR for suction curettage    Osiris Raphael MD

## 2024-04-26 NOTE — PROGRESS NOTES
PICC consult received, patient off of pressors.  Spoke with provider and no PICC is indicated at this time. Please reconsult peds VAS if a PICC is required in the future.

## 2024-04-26 NOTE — ANESTHESIA CARE TRANSFER NOTE
Patient: Naty Joseph    Procedure: Procedure(s):  Dilation and curettage suction  Hysterectomy total abdominal       Diagnosis: * No pre-op diagnosis entered *  Diagnosis Additional Information: No value filed.    Anesthesia Type:   Epidural     Note:    Oropharynx: endotracheal tube in place  Level of Consciousness: iatrogenic sedation and unresponsive  Patient oxygen source: Ambu bag.  Level of Supplemental Oxygen (L/min / FiO2): 6  Independent Airway: airway patency not satisfactory and stable  Dentition: dentition unchanged  Vital Signs Stable: post-procedure vital signs reviewed and stable  Report to RN Given: handoff report given  Patient transferred to: ICU    ICU Handoff: Call for PAUSE to initiate/utilize ICU HANDOFF, Identified Patient, Identified Responsible Provider, Reviewed the Pertinent Medical History, Discussed Surgical Course, Reviewed Intra-OP Anesthesia Management and Issues during Anesthesia, Set Expectations for Post Procedure Period and Allowed Opportunity for Questions and Acknowledgement of Understanding      Vitals:  Vitals Value Taken Time   /80    Temp     Pulse 70 04/26/24 0045   Resp 15 04/26/24 0045   SpO2 100 % 04/26/24 0045   Vitals shown include unfiled device data.    Electronically Signed By: Carl London Junior, MD  April 26, 2024  12:46 AM

## 2024-04-26 NOTE — ANESTHESIA PROCEDURE NOTES
Airway       Patient location during procedure: OR       Procedure Start/Stop Times: 4/25/2024 10:00 PM  Staff -        Resident/Fellow: Carl Dimas Jr., MD       Performed By: resident  Consent for Airway        Urgency: emergent  Indications and Patient Condition       Indications for airway management: katie-procedural       Induction type:RSI       Mask difficulty assessment: 0 - not attempted    Final Airway Details       Final airway type: endotracheal airway       Successful airway: ETT - single  Endotracheal Airway Details        ETT size (mm): 7.0       Cuffed: yes       Successful intubation technique: video laryngoscopy       VL Blade Size: Glidescope 4       Grade View of Cords: 1       Adjucts: stylet       Position: Right       Measured from: lips       Secured at (cm): 22       Bite block used: None    Post intubation assessment        Placement verified by: capnometry, equal breath sounds and chest rise        Number of attempts at approach: 1       Number of other approaches attempted: 0       Secured with: tape       Ease of procedure: easy       Dentition: Intact and Unchanged    Medication(s) Administered   Medication Administration Time: 4/25/2024 10:00 PM

## 2024-04-26 NOTE — PROGRESS NOTES
Baby remains in L&D fetal loss room. All mementos are in bassinet with baby. Pt would like to see baby again before discharge.

## 2024-04-26 NOTE — PROGRESS NOTES
Goal Outcome Evaluation     ICU End of Shift Summary:     General: Patient admitted due to induction and termination of pregnancy   Neuro: Alert and orientated X4, pupils PERRLA, complains of 8/10 abdominal pain, given IV toradol, tylenol, oxycodone and dilaudid. Offered a taps block via anesthesia, patient consented and then declined due to not wanting to be poked again. KARON updated.   Pulm/Resp: Lung sounds clear and equal bry. Saturation 100% on RA. Extubated at 0855 this morning with respiratory, patient extubated without difficulty.   CV: NSR, occasional bradycardia noted throughout shift. ART line pulled this shift, not working or drawing back blood.   GI: NG pulled this morning, passed bedside swallow test, upgraded to clear liquids, tolerating well, upgraded to regular diet, has not eaten this shift.   : Prescott in place draining clear urine, high output, see flowsheet.   Access: 2 PIV's, right PIV infiltrated (phenylnephrine infusing when infiltrated, patient right arm and mostly hand is swollen, KARON updated and assessed, swelling improved throughout the shift, right digits still swollen, able to move arm/hand/&digits, CMS intact).    Skin Abdominal incision dressed with gauze, CDI, lower back, small puncture site from epidural MORGAN, (per labor and delivery nurse recommendation, labor nurse pulled epidural at about 0915) site CDI.   Gtts: phenylnephrine 0.5, LR 75ml/hr (given 1000ml bolus this shift).

## 2024-04-26 NOTE — SUMMARY OF CARE
Neuro: Patient on sedation.   PERRLA    Moving all limbs.   RASS Goal 0 / -1     CV: Sinus bradycardia / sinus rhythm   MAP Goal above 65 mmHg              Generalized edema +1     Respiratory: On mechanical ventilator with ETT 7,  20  at teeth   Mode: CMV AC   Rate: 15   TV: 340   PEEP: 5   FiO2: 21%     GI: NPO.     : On schulte catheter, draining adequately to clear yellow urine output     Lines: 3 PIV 2R/1L   Rt radial arterial line (not working)   Epidural Catheter   Nasogastric tube Lt Nare      Drips: Propofol 45 mcg/kg/min   Phenylephrine 0.8 mcg/kg/min   LR 75 ml/hr   Fentanyl+Ropivacaine (thru epidural) 6ml/40mins    Skin: Surgical incision abdomen with dressing. Dry and intact.        Highlights of this shift:     - Post D&C and hysterectomy.  - Arterial line not working.  - Magnesium replacement done. Recheck due at 1000 hours.  - To run RN Management protocol for (Potassium & Phosphorus). Awaiting for lab results.

## 2024-04-26 NOTE — PROGRESS NOTES
Supportive visit with Naty and Slade this morning.  Naty shared it is difficult for her to talk and her throat is sore.  Her tears readily flowed.      SW offered Spiritual Health Support.  Naty declines this.    SW asked if Naty and Slade would like to have baby brought up from [facility].  Naty very much wants this.  RN, Kalpana, is bringing baby to her now.  NICU staff working on molds of baby's hands and feet.     SW shared infant loss resources including:  Empty Cradle, Broken Heart:: Surviving the Death of Your Baby  [facility]  Ending a Wanted Pregnancy- on-line     SW contacted [employer] [phone] to provide information for baby Corrina's (sp?) cremation.    GABRIEL Nielsen [facility]  Clinical   Maternal Child Health  Voicemail:  [phone]  Reachable via PayTouch

## 2024-04-26 NOTE — PROGRESS NOTES
Called to room for PPH  Sweep evacuated signifcant clot burden from uterus  Current EBL 1070 ml  Labs ordered  She has received methergine and TXA. IV pit running.   Fundus firm now. Continues to have small gush with massage but improving    Osiris Raphael MD

## 2024-04-26 NOTE — OP NOTE
Gynecologic Oncology Operative Report    Naty Joseph  [id number]    PREOPERATIVE DIAGNOSIS:   at 22w4d   Fetal monosomy X  Significant coagulopathy  Postpartum hemorrhage    POSTOPERATIVE DIAGNOSIS:  Same, delivered    PROCEDURE:  Postpartum hysterectomy    SURGEON:  MD Osiris Srivastava MD    ASSISTANT:  Rhina Gonsales MD Gyn Onc Fellow  Gayle Reardon MD    ANESTHESIA:  GETA    EBL:   Total QBL 3227     FLUIDS:  5u pRBC  5u cryo  4u FFP  1u Plt    SPECIMENS:  Uterus, cervix, bilateral fallopian tubes    INDICATIONS:  Naty Joseph is a  at 22w4d who underwent induction termination for fetal monosomy X. She had a vaginal delivery that was complicated by massive postpartum hemorrhage. Please see OB/GYN note for more details. Ultimately recommendation was for hysterectomy and gynecology oncology was called for emergent procedure.     OPERATIVE FINDINGS:  Patient was coagulopathic upon arrival to the OR. Significant vaginal bleeding. No trauma on entry into abdomin. Uterus with good tone, however vagina was full of clot that was able to be expressed throughout the case. Bilateral adnexa appear normal, normal appendix. Cervix completely dilated and bruise, however intact.    COMPLICATIONS:  None    DESCRIPTION OF PROCEDURE:  Please see operative note from OBGYN for more detail, but when we arrived the patient was being induced for intubation. Once patient was intubated she was positioned in dorsal lithotomy, prepped and draped. She received Ancef and Flagyl for infection prophylaxis. A time out was performed.     A vertical midline incision was made from 2cm below the umbilicus to the pubic symphysis. This was taken down to the fascia with cautery. The fascia was incised the length of the incision. The peritoneum was grasped and entered sharply. This was extended the length of the incision. The rectus muscles were already  along the length of the incision from her  diastasis.     The bookwalter retractor was placed and the bowel packed into the upper abdomen. The round ligament on the right was isolated, grasped, and suture ligated. It was then cauterized and transected with cautery.  The retroperitoneum was entered. Staying well above the ureter a window was made in the medial leaf of the broad ligament and the utero-ovarian vessels were isolated, cauterized and transected with the LigaSure device.  The same was performed in the left side.      At this point attention was turned to the bladder which was carefully dissected off the uterine wall and lower uterine segment. With careful dissection the bladder was taken down past the level of the cervix. Once it was felt that the bladder was safely below the uterine vessels, the uterine vessels were doubly clamped with He[place], transected and suture ligated.  In a similar fashion, we then continued with double clamping of the cardinals which did take several bites.  We were eventually able to enter the vagina with cautery.  Once the vagina had been entered the colpotomy was extended and the entire uterus and cervix were removed. The vagina was then closed with multiple figure of 8 sutures. A single figure of 8 suture was placed along the posterior vaginal cuff for hemostasis at the serosal edge.    All sponges were removed and the Bookwalter retractor was removed.  The abdomen was then irrigated with 2L of normal saline and good hemostasis was assured. Fascia was closed with 0 looped PDS in 2 segments meeting in the middle. Subcutaneous tissue was reapproximated with vicryl sutures and skin closed with vicryl.     Sponge, lap and needle counts were reported as correct x2 and instrument count was correct x1 however due to inadequate count at the beginning of the procedures an XRay was performed, which showed no evidence of retained sponge or instrument. The patient was taken to the PACU in stable condition with plan for transfer to  the ICU    Dr Johnson was present and scrubbed for the procedure.    Rhina Gonsales MD  Gynecology Oncology Fellow  April 25, 2024 , 11:49 PM        As the primary surgeon, I was scrubbed and present for the full course of the procedure.    Marlene Johnson M.D.

## 2024-04-26 NOTE — PROGRESS NOTES
[facility] ICU PROGRESS NOTE  2024    Date of Service (when I saw the patient): 2024    ASSESSMENT: Naty Joseph is a 28 year old with no significant PMH is  at 20w6d who presents for induction and termination of pregnancy in the setting of fetal monosomy X and multiple anomalies including hydrops, L ventriculomegaly, FGR, and cystic hygroma. Patient presented for IOL and termination of pregnancy now complicated by PPH requiring trip to OR for suction and currettage with EUA and mechanical ventilation. Patient now admitted [facility] ICU  for PPH s/p hysterectomy, resuscitation, hemodynamic monitoring and mechanical ventilation.     Aleida-op: EBL ~ 3127 mL, 3 uFFP given, 5 uPRBC, 5 units cryoprecipitate, 1 Platelet, Ca+ Chloride 1 gm x 2 given, 2L crystalloid, 5% 12.5 gm albumin, and phenylephrine infusion.     PLAN:    Changes in Plan:  - Extubated  - Wean phenyl     Neurological:  # Acute pain  # Sedation for mechanical ventilation  - Epidural in place; Ropivacaine/fentanyl ordered  - Anesthesia/pain consulted    Pulmonary:   # Post operative ventilatory support   - Supplemental oxygen to keep saturation above 92 %.  - Extubated    Cardiovascular:    # Hemorrhagic shock  - Phenylephrine infusion, wean as able  - MAP > 65, and SBP < 160  - See intra-op above for resuscitation efforts  - 1,000 ml LR given this AM    Gastroenterology/Nutrition:  # No acute concerns  - Bedside swallow  - Bowel regimen ordered  - NG tube in place    Renal/Fluids/Electrolytes:   # Hypomagnesia  # Hypocalcemia  # Compensated metabolic acidosis 2/2 hemorrhagic shock  - Prescott cath in place  - Will continue to monitor intake and output.  - LR 75 ml/hr    OB/GYN:  # Pregnancy complicated by fetus with lethal anomalies  # IOL with pregnancy termination and vaginal delivery on 24  # PPH s/p hysterectomy on 24 with Dr. Raphael  Patient induced for termination of pregnancy earlier today with spontaneous placenta  delivery. Later OB/GYN returned and performed sweep evacuation of significant clot burden (EBL ~ 1070mL) and fundal massage. OB/GYN again returned for bleeding (EBL ~ 1100 mL) requiring trip to OR for suction curettage with EUA. It appears suction curettage did not improve PPH despite uterotonic's and coagulopathy correction requiring hysterectomy.  - Uterotonic's administered per OB  - OB/gyn following  - Lochia monitoring per OB    Endocrine:  # No acute concerns  - No management indication   - Goal to keep BG< 180 for optimal wound healing   - Hypoglycemia order set in place     ID:  # No acute concerns  - Aleida-op antibiotic regimen per OB (flagyl & Cefazolin given)  - CTM fever curve and WBC and signs of infection    Heme:     # Stress induced leukocytosis  # Acute blood loss anemia    # Coag defect 2/2 pregnancy  - Blood components given see intra-op above  - Transfuse if hgb <7.0 or signs/symptoms of hypoperfusion. Monitor and trend  - Serial CBC plt w/ diff, and coag panels ordered    Musculoskeletal/Skin:  # Weakness and deconditioning of critical illness  # Midline abdominal incision  - Physical and occupational therapy consult  - Diligent RN cares    General Cares/Prophylaxis:  DVT Prophylaxis: Pneumatic Compression Devices  GI Prophylaxis: PPI  Restraints: ordered  Family Communication: OB/Gyn updated family  Code Status: Full code    Lines/tubes/drains:  - PIV  - ETT  - Prescott  - OG  - A-line    Disposition:  - [facility] ICU    Patient seen and findings/plan discussed with medical ICU staff, Dr. Kj Sánchez.    I spent a total of 47 minutes (excluding procedure time) personally providing and directing critical care services at the bedside and on the critical care unit for Naty Suarez PA-C    ====================================  INTERVAL HISTORY:   Patient admitted to the ICU. Remained intubated. Hemodynamically stable overnight.    OBJECTIVE:   1. VITAL SIGNS:   Temp:  [96.7   F (35.9  C)-101.4  F (38.6  C)] 97.8  F (36.6  C)  Pulse:  [] 61  Resp:  [13-24] 15  BP: ()/() 109/59  MAP:  [51 mmHg-352 mmHg] 352 mmHg  Arterial Line BP: ()/() 104/89  FiO2 (%):  [21 %-35 %] 21 %  SpO2:  [97 %-100 %] 99 %  Vent Mode: CMV/AC  (Continuous Mandatory Ventilation/ Assist Control)  FiO2 (%): 21 %  Resp Rate (Set): 15 breaths/min  Tidal Volume (Set, mL): 340 mL  PEEP (cm H2O): 5 cmH2O  Resp: 15    2. INTAKE/ OUTPUT:   I/O last 3 completed shifts:  In: 5783.69 [I.V.:2832.69]  Out: 5558 [Urine:1875; Blood:3683]    3. PHYSICAL EXAMINATION:  PHYSICAL EXAM  General: Lying supine in bed, in no acute distress, intubated and sedated  Skin:  Pale, warm and dry.   Neuro: CNII-XII grossly intact  ENT: PERRL, EOMI, no nystagmus, anicteric  Heart: Regular rate and rhythm, no murmurs, rubs, or gallops appreciated  Lungs: coarse BL , unlabored breathing, on mechanical ventilation  Abdomen: Soft, nondistended, nontender, BS x4  Extremities: Moves to command, no edema, capillary refill <3 sec BL upper and lower extremities.  Mental:  Alert and oriented to person, place, and time.    4. LABS:   Arterial Blood Gases   Recent Labs   Lab 04/26/24  0119 04/25/24 2352 04/25/24 2309   PH 7.39 7.35 7.32*   PCO2 35 37 35   PO2 206* 221* 235*   HCO3 21 20* 18*     Complete Blood Count   Recent Labs   Lab 04/26/24  0120 04/25/24 2353 04/25/24 2352 04/25/24 2309 04/25/24 2247 04/25/24  2138   WBC 7.5 9.3  --   --  14.7* 9.5   HGB 9.1*  9.1* 8.8* 9.1* 9.8* 8.0* 6.7*   PLT 77* 80*  --   --  166 115*     Basic Metabolic Panel  Recent Labs   Lab 04/26/24  0405 04/26/24  0120 04/26/24  0037 04/25/24  2352 04/25/24  2309 04/25/24  2248   NA  --  137  --  137 136 137   POTASSIUM  --  3.9  --  3.9 4.0 4.4   CHLORIDE  --  107  --   --   --  109*   CO2  --  20*  --   --   --  19*   BUN  --  6.2  --   --   --  7.5   CR  --  0.46*  --   --   --  0.48*   * 106* 109* 107* 154* 138*     Liver Function  Tests  Recent Labs   Lab 04/26/24  0120 04/25/24 2353 04/25/24 2247 04/25/24 2138   AST 26  --   --   --    ALT 18  --   --   --    ALKPHOS 49  --   --   --    BILITOTAL 1.4*  --   --   --    ALBUMIN 2.9*  --   --   --    INR 1.12 1.22* 1.34* 1.79*     Coagulation Profile  Recent Labs   Lab 04/26/24  0120 04/25/24 2353 04/25/24 2247 04/25/24 2138   INR 1.12 1.22* 1.34* 1.79*   PTT 39* 27 36 44*       5. RADIOLOGY:   Recent Results (from the past 24 hour(s))   XR Abdomen Port 1 View    Impression    RESIDENT PRELIMINARY INTERPRETATION  IMPRESSION: Enteric tube tip and sidehole project over the stomach.  Nonobstructive bowel gas pattern. No radiopaque foreign body  identified.   XR Chest Port 1 View    Impression    RESIDENT PRELIMINARY INTERPRETATION  IMPRESSION: Endotracheal tube tip projects over upper thoracic  trachea. Enteric tube sidehole projects over proximal stomach. No  acute airspace opacity.

## 2024-04-26 NOTE — PROGRESS NOTES
Fetal Survery    Naty Joseph is a 28 year old  who presented at 22w4d  who presented for IOL for induction termination in the setting of Monosomy X and multiple lethal fetal anomalies. She was induced with 3 doses of misoprostol, and ultimately had a spontaneous vaginal delivery of a stillborn infant at 1847 on 24.    Length: 31mm  Head Circumference: 24mm  Foot length: 3mm    External examination reveals a  infant with ambiguous genitalia. Infant with global hydropic appearance with diffuse generalized edema  Head: Bilateral orbits with fused eyelids. Small nose with patent nares. Mouth with fused lips. Palate unable to be assessed due to infant size. Normal chin. Bilateral ears normal in shape, though low set. Skull appears enlarged and misshapen with cystic hygroma encompassing large portion of posterior neck/skull base area.   Thorax: Enlarged shape. Bilateral nipple buds symmetrical.   Abdomen/Pelvis: Enlarged, swollen abdomen without obvious abdominal wall defect. Ambiguous appearing genitalia. Anus visible and does not appear patent.  Limbs: Five digits noted on all extremities fused together. with no syndactyly or abnormal creases. Arms discordant in length.   Cord/Placenta: Attached 3 vessel cord appears normal. Placenta appears intact without odor. Amniotic fluid clear.  in color.    Olegario Reardon DO, MS  Obstetrics, Gynecology & Women's Health   Resident, PGY-3  2024 3:22 AM

## 2024-04-26 NOTE — L&D DELIVERY NOTE
of 20+6 weeks over intact perineum following IOL for multiple lethal fetal anomalies. Placenta delivered spontaneous and intact 30s after birth of fetus.    ml  No signs of life in  at birth.   Osiris Raphael MD

## 2024-04-26 NOTE — PROVIDER NOTIFICATION
04/26/24 0400   Infant Care/Family Considerations   Mementos Gathered ID bracelet;Footprints;Handprints;Photos;Hat;[place]   Identification Bands Applied to Baby Done   Weight/Measurements Done     Mementos complete with hand/feet molds & pictures left in progress. Measurements for baby include:    Weight: 737g  Head Circumference: 8 in  Length: 9.25 in

## 2024-04-26 NOTE — DISCHARGE INSTRUCTIONS
Psychiatry Resources:      [employer]  Quiana Vasqueziter PMH-C  PM-C Certified  Mental Health Professional  [phone]  [phone]  (fax)  [address] [facility] , [address] [address], [facility]  PSI Helpline: call or text [phone]    [employer] (PSI) is the world s leading non-profit organization dedicated to helping those suffering from  mood disorders, the most common complication of childbirth.    Our [place] is to increase awareness, education, prevention, and treatment of  mental health issues affecting individuals, their families, and support systems in all areas of [place].    Our Vision is that all pregnant and postpartum individuals and their families, including those from underserved communities, will have access to  support, mental health/healthcare providers, education, and resources to improve overall well-being through advocacy, training, and increased awareness of  mental health.    [email or web address]    Support Groups:  https://www.postpartum.net/get-help/psi-online-support-meetings/

## 2024-04-26 NOTE — CONSULTS
Initial Psychiatric Consult   Consult date: 2024         Reason for Consult, requesting source:    Traumatic delivery   Requesting source: Osiris Raphael    Labs and imaging reviewed. Patient seen and evaluated by SHAJI Mars CNP          HPI:   Naty Joseph is a 28 year old with no significant PMH is  at 20w6d who presents for induction and termination of pregnancy in the setting of fetal monosomy X and multiple anomalies including hydrops, L ventriculomegaly, FGR, and cystic hygroma. Patient presented for IOL and termination of pregnancy now complicated by PPH requiring trip to OR for suction and currettage with EUA and mechanical ventilation. Patient now admitted [facility] ICU  for PPH s/p hysterectomy, resuscitation, hemodynamic monitoring and mechanical ventilation.     Psychiatry consult was placed given traumatic delivery and termination of pregnancy. Maternal  provided supportive visit and resources to patient this morning and noted patient to be tearful. On assessment, patient's  was at bedside. Patient was in good spirits and declined need for any psychotropic interventions nor referrals for outpatient.          Past Psychiatric History:   No history in EMR        Substance Use and History:   Denies -- no history in EMR        Past Medical History:   PAST MEDICAL HISTORY: History reviewed. No pertinent past medical history.    PAST SURGICAL HISTORY: History reviewed. No pertinent surgical history.          Family History:   FAMILY HISTORY: History reviewed. No pertinent family history.    Family Psychiatric History: denies        Social History:   SOCIAL HISTORY:   Social History     Tobacco Use    Smoking status: Never    Smokeless tobacco: Never   Substance Use Topics    Alcohol use: Not Currently                Physical ROS:   The 10 point Review of Systems is negative other than noted in the HPI or here.           Medications:     Current  Facility-Administered Medications   Medication Dose Route Frequency Provider Last Rate Last Admin    chlorhexidine (PERIDEX) 0.12 % solution 15 mL  15 mL Mouth/Throat Q12H Samuel Munoz APRN CNP   15 mL at 04/26/24 0820    docusate sodium (COLACE) capsule 100 mg  100 mg Oral Daily Osiris Raphael MD        pantoprazole (PROTONIX) 2 mg/mL suspension 40 mg  40 mg Per Feeding Tube [facility] Samuel Munoz APRN CNP   40 mg at 04/26/24 0820    Or    pantoprazole (PROTONIX) IV push injection 40 mg  40 mg Intravenous [facility] Samuel Munoz APRN CNP        polyethylene glycol (MIRALAX) Packet 17 g  17 g Oral Daily Samuel Munoz APRN CNP   17 g at 04/26/24 0820              Allergies:   No Known Allergies       Labs:     Lab Results   Component Value Date    WBC 7.7 04/26/2024    HGB 9.4 (L) 04/26/2024    HCT 27.6 (L) 04/26/2024     (L) 04/26/2024     04/26/2024    POTASSIUM 3.7 04/26/2024    CHLORIDE 108 (H) 04/26/2024    CO2 24 04/26/2024    BUN 5.4 (L) 04/26/2024    CR 0.51 04/26/2024    GLC 88 04/26/2024    DD >20.00 (HH) 04/25/2024    AST 25 04/26/2024    ALT 16 04/26/2024    ALKPHOS 47 04/26/2024    BILITOTAL 1.1 04/26/2024    INR 1.12 04/26/2024              Physical and Psychiatric Examination:     /58   Pulse 73   Temp 98.1  F (36.7  C) (Axillary)   Resp 14   LMP 11/19/2023 (Exact Date)   SpO2 100%   Breastfeeding Unknown   Weight is 0 lbs 0 oz  There is no height or weight on file to calculate BMI.    Physical Exam:  I have reviewed the physical exam as documented by by the medical team and agree with findings and assessment and have no additional findings to add at this time.    Mental Status Exam:    Appearance: awake, alert and adequately groomed  Attitude:  cooperative  Eye Contact:  good  Mood:  better  Affect:  appropriate and in normal range and mood congruent  Speech:  clear, coherent  Language: Fluent in english   Psychomotor Behavior:  no evidence of tardive dyskinesia,  dystonia, or tics  Thought Process:  logical, linear, and goal oriented  Associations:  no loose associations  Thought Content:  no evidence of suicidal ideation or homicidal ideation and no evidence of psychotic thought  Insight:  good  Judgement:  intact  Oriented to:  time, person, and place  Attention Span and Concentration:  intact  Recent and Remote Memory:  intact  Fund of Knowledge: Appropriate   Gait and Station: baseline               DSM-5 Diagnosis:   Bereavement           Assessment:   Naty is a 28 year old female with no psychiatric history who is s/p stillborn delivery complicated by hemorrhage necessitating hysterectomy. She is appropriately grieving with her support system and declines any psychotropic interventions. I normalized her current emotions given the recent events and encouraged her to reach out for help if the grief lasts longer than expected, interferes with her ability to care for herself/children, or she has trouble sleeping. She verbalized understanding. declined referral to  psychiatry on discharge at this time.            Summary of Recommendations:     No interventions at this time. Placed crisis resources in AVS.     Psychiatry signing off --      ARIC Medina-BC  Consult/Liaison Psychiatry   [facility]

## 2024-04-26 NOTE — DISCHARGE SUMMARY
[facility] Discharge Summary    Patient: Naty Joseph    YOB: 1995  MRN# [id number]      Date of Admission:  4/25/2024  Date of Discharge::  4/29/2024     Admitting Physician:  Osiris Raphael MD  Discharge Physician:  Mariely Durand MD           Admission Diagnoses:   - IUP at 22w5d  - Induction termination  - Fetal Monosomy X  - Multiple Lethal Fetal Anomalies          Discharge Diagnosis:   - IUP at 22w5d, now delivered  - Suspected DIC, pregnancy related  - Postpartum hemorrhage  - Acute blood loss Anemia         Procedures:     Procedure(s): - Spontaneous Vaginal Delivery   - Suction dilation and curettage  - Total Abdominal Hysterectomy, Bilateral Salpingectomy              Medications Prior to Admission:     Facility-Administered Medications Prior to Admission   Medication Dose Route Frequency Provider Last Rate Last Admin    [COMPLETED] miFEPRIStone (MIFEPREX) tablet 200 mg  200 mg Oral Once    200 mg at 04/24/24 1317     No medications prior to admission.             Discharge Medications:        Review of your medicines        START taking        Dose / Directions   acetaminophen 325 MG tablet  Commonly known as: TYLENOL  Used for: Spontaneous vaginal delivery      Dose: 650 mg  Take 2 tablets (650 mg) by mouth every 6 hours as needed for mild pain Start after Delivery.  Quantity: 100 tablet  Refills: 0     ibuprofen 600 MG tablet  Commonly known as: ADVIL/MOTRIN  Used for: Spontaneous vaginal delivery      Dose: 600 mg  Take 1 tablet (600 mg) by mouth every 6 hours as needed for moderate pain Start after delivery  Quantity: 60 tablet  Refills: 0     oxyCODONE 5 MG tablet  Commonly known as: ROXICODONE  Used for: Spontaneous vaginal delivery, S/P abdominal hysterectomy      Dose: 5 mg  Take 1 tablet (5 mg) by mouth every 6 hours as needed for pain  Quantity: 12 tablet  Refills: 0     senna-docusate 8.6-50 MG tablet  Commonly known as:  SENOKOT-S/PERICOLACE  Used for: Spontaneous vaginal delivery      Dose: 1 tablet  Take 1 tablet by mouth daily Start after delivery.  Quantity: 100 tablet  Refills: 0               Where to get your medicines        These medications were sent to [facility] [place], [place] - [address]  [address] [facility] [address], [facility] [address]      Phone: [phone]   acetaminophen 325 MG tablet  ibuprofen 600 MG tablet  oxyCODONE 5 MG tablet  senna-docusate 8.6-50 MG tablet               Consultations:   Anesthesiology  ICU  Social Work          Brief Admission History   Naty Joseph is a 28 year old  at 20w6d who presents for induction termination in the setting of fetal monosomy X and multiple anomalies including hydrops, L ventriculomegaly, FGR, and cystic hygroma.  Here today with  and parents.          Brief Intrapartum Course:   Naty Joseph is a 28 year old  at 22w5d who was admitted to labor and delivery for induction termination in the setting of NIPT + for Monosomy X, and multiple fetal anomolies including fetal growth restriction, cystic hygroma, hydrops, and ventriculomegaly. Induction course began with misoprostol for which patient received 3 doses, after which she quickly had an uncomplicated delivery. Initial . Shortly following delivery attending staff was called back to the room due to increased bleeding, patient given uterotonic including increased IV pitocin, and methergine. TXA also given. Manual sweep was performed and significant amount of clot was evacuated. Despite this patient continued to have ongoing bleeding with fundal massage and bimanual.  Total EBL at this point 1100.  Options for management at this point were discussed with patient including presenting to OR for suction dilation and curettage.          Operative Course   Procedures were complicated by hemorrhage. Total Procedural EBL 3227    D&C Findings.  BSUS used to visualize uterus  with significant clot burden. On EUA, No evidence of cervical laceration. Suction curettage performed with ultrasound guidance did not improve the PPH. Despite multiple uterotonics and administration of blood products bleeding persisted   Please see full operative note for additional information    Hysterectomy findings  Patient was coagulopathic upon arrival to the OR. Significant vaginal bleeding. No trauma on entry into abdomin. Uterus with good tone, however vagina was full of clot that was able to be expressed throughout the case. Bilateral adnexa appear normal, normal appendix. Cervix completely dilated and bruise, however intact.   Please see full operative note for additional information           Hospital Course:   The patient's hospital course was notable for ICU admission following hysterectomy. Stable by HD#2 and care transferred back to OB team though stayed in ICU until day of discharge.  On discharge, her pain was well controlled. Voiding without difficulty.  Ambulating well and tolerating a normal diet.  She was discharged on post-op day #3.          Discharge Instructions and Follow-Up:     Discharge diet: Regular   Discharge activity: - No lifting >15 lbs or strenuous exercise for 6 weeks  - No driving while taking narcotics or until you can slam on the breaks without pain     Discharge follow-up: Follow up with primary OB at 6 weeks.    Return Precautions: Patient was instructed to call  or return to ED for any of the following:    - Temperature greater than 100.4F   - Pain not controlled by pain medications   - Uncontrolled nausea/vomiting   - Foul-smelling vaginal discharge   - Vaginal bleeding soaking 1 pad per hour for 2 hours in a row          Discharge Disposition:     Discharged to home in stable condition      Patient was seen and evaluated by Dr. Durand on day of discharge    Olegario Reardon DO, MS  Obstetrics, Gynecology & Women's Health   Resident, PGY-3  04/29/2024 6:57  AM    Appreciate note by Dr. Reardon. Patient has been seen and examined by me separate from the resident, agree with above note.     Mariely Durand MD  12:16 PM

## 2024-04-26 NOTE — ANESTHESIA PROCEDURE NOTES
Arterial Line Procedure Note    Pre-Procedure   Staff -        Anesthesiologist:  Luz Pierre MD       Resident/Fellow: Carl Dimas Jr., MD       Performed By: resident       Location: OB       Pre-Anesthestic Checklist: patient identified, IV checked, risks and benefits discussed, informed consent, monitors and equipment checked, pre-op evaluation and at physician/surgeon's request  Timeout:       Correct Patient: Yes        Correct Procedure: Yes        Correct Site: Yes        Correct Position: Yes   Line Placement:   This line was placed Post Induction  Procedure   Procedure: arterial line       Laterality: right       Insertion Site: radial.  Sterile Prep        Standard elements of sterile barrier followed       Skin prep: Chloraprep  Insertion/Injection        Technique: ultrasound guided and Seldinger Technique        1. Ultrasound was used to evaluate the access site.       2. Artery evaluated via ultrasound for patency/adequacy.       3. Using real-time ultrasound the needle/catheter was observed entering the artery/vein.       Catheter Type/Size: 20 G, 12 cm  Narrative        Tegaderm dressing used.       Complications: None apparent,        Arterial waveform: Yes

## 2024-04-26 NOTE — PROGRESS NOTES
Assumed care from Kimberly in the OR during the start of the hysterectomy at 2210. Pt had consented to D&C which proceeded to become a hysterectomy. MTP was started. Uterotonic meds given by Anesthesia, see MAR & Anesthesia notes for more. Missing one lap from D&C. X-Ray was taken, nothing abnormal found. Provider notified and aware. Uterus & fallopian tubes sent to pathology. Pt in stable condition. Total QBL from delivery to after completing the hysterectomy was 3405 mL. See provider notes for OP details. Transferred to ICU RM 1039 at 0029. Report given to MILENA Leslie.

## 2024-04-26 NOTE — BRIEF OP NOTE
[facility]    Brief Operative Note    PreOp Dx: PPH, coagulopathy  PostOp Dx: same  Procedure: EUA, suction curettage, attempted uterine balloon placement, bedside ultrasound  EBL: 2000 ml total including delivery   Surgeon: Osiris Raphael MD  Assistants: Gayle Reardon MD and Wiliam Barry MD  Findings: no evidence of cervical laceration, suction curettage performed with ultrasound guidance did not improve the PPH. Despite multiple uterotonics and administration of blood products bleeding persisted    Decision was made to proceed to hysterectomy, anesthesia was aware and agreed, instruments and gyn onc were called for.     [place] catheter was placed and patient was re-positioned, prepped and draped.   Please see subsequent op note     Osiris Raphael MD

## 2024-04-26 NOTE — PROGRESS NOTES
[facility] H&P  2024    Date of Hospital Admission: 24   Date of ICU Admission: 24  Reason for Critical Care Admission: Post-partum hemorrhage  Date of Service (when I saw the patient): 2024    ASSESSMENT: Naty Joseph is a 28 year old with no significant PMH is  at 20w6d who presents for induction and termination of pregnancy in the setting of fetal monosomy X and multiple anomalies including hydrops, L ventriculomegaly, FGR, and cystic hygroma. Patient presented for IOL and termination of pregnancy now complicated by PPH requiring trip to OR for suction and currettage with EUA and mechanical ventilation. Patient now admitted [facility]  for PPH s/p hysterectomy, resuscitation, hemodynamic monitoring and mechanical ventilation.     Aleida-op: EBL ~ 3127 mL, 3 uFFP given, 5 uPRBC, 5 units cryoprecipitate, 1 Platelet, Ca+ Chloride 1 gm x 2 given, 2L crystalloid, 5% 12.5 gm albumin, and phenylephrine infusion.       PLAN:    Neurological:  # Acute pain   # Sedation for mechanical ventilation  - Monitor neurological status. Delirium preventions and precautions.   - RASS goal 0 to -1  - Epidural in place; Ropivacaine/fentanyl ordered  - Anesthesia/pain consulted  - Pain: Fentanyl                         - Sedation plan: Propofol     Pulmonary:   # Post operative ventilatory support   - Ventilatory bundle.  - Continue full vent support.   - PST when meets criteria.    - Supplemental oxygen to keep saturation above 92 %.  - Evaluate extubation readiness in AM       Cardiovascular:    # Hemorrhagic shock  - Monitor hemodynamic status.    - Phenylephrine infusion available  - MAP > 65, and SBP < 160  - See intra-op above for resuscitation efforts    Gastroenterology/Nutrition:  # No acute concerns  - NPO  - Bowel regimen ordered  - NG tube in place     Renal/Fluids/Electrolytes:   # Hypomagnesia  # Hypocalcemia  # Compensated metabolic acidosis 2/2 hemorrhagic shock  - Prescott cath in place  -  Will continue to monitor intake and output.  - LR 75 ml/hr    OB/GYN:  # Pregnancy complicated by fetus with lethal anomalies  # IOL with pregnancy termination and vaginal delivery on 4/26/24  # PPH s/p hysterectomy on 4/26/24 with Dr. Raphael  Patient induced for termination of pregnancy earlier today with spontaneous placenta delivery. Later OB/GYN returned and performed sweep evacuation of significant clot burden (EBL ~ 1070mL) and fundal massage. OB/GYN again returned for bleeding (EBL ~ 1100 mL) requiring trip to OR for suction curettage with EUA. It appears suction curettage did not improve PPH despite uterotonic's and coagulopathy correction requiring hysterectomy.  - Uterotonic's administered per OB  - OB/gyn following  - Lochia monitoring per OB     Endocrine:  # No acute concerns  - No management indication   - Goal to keep BG< 180 for optimal wound healing   - Hypoglycemia order set in place      ID:  # No acute concerns  - Aleida-op antibiotic regimen per OB (flagyl & Cefazolin given)  - CTM fever curve and WBC and signs of infection       Heme:     # Stress induced leukocytosis  # Acute blood loss anemia    # Coag defect 2/2 pregnancy  - Blood components given see intra-op above  - Transfuse if hgb <7.0 or signs/symptoms of hypoperfusion. Monitor and trend.   - Serial CBC plt w/ diff, and coag panels ordered     Musculoskeletal/Skin:  # Weakness and deconditioning of critical illness   # Midline abdominal incision  - Physical and occupational therapy consult   - Diligent RN cares         General Cares/Prophylaxis:    DVT Prophylaxis: Pneumatic Compression Devices  GI Prophylaxis: PPI  Restraints: ordered  Family Communication: OB/Gyn updated  Code Status: Full    Lines/tubes/drains:  - PIV  - Prescott  - OG  - A-line    Disposition:  - Medical ICU     Billing: This patient is critically ill: Yes. Total critical care time today 40 min. This does not include time spent on procedures or teaching.     Samuel  SHAJI Munoz CNP      Clinically Significant Risk Factors Present on Admission        # Hypokalemia: Lowest K = 2.6 mmol/L in last 2 days, will replace as needed   # Hypocalcemia: Lowest iCa = 3.4 mg/dL in last 2 days, will monitor and replace as appropriate   # Hypomagnesemia: Lowest Mg = 1.2 mg/dL in last 2 days, will replace as needed    # Coagulation Defect: INR = 1.34 (Ref range: 0.85 - 1.15) and/or PTT = 36 Seconds (Ref range: 22 - 38 Seconds), will monitor for bleeding  # Thrombocytopenia: Lowest platelets = 115 in last 2 days, will monitor for bleeding     # Circulatory Shock: required vasopressors within past 24 hours               # Anemia: based on hgb <11             -----------------------------------------------------------------------    HISTORY PRESENTING ILLNESS:   Naty Joseph is a 28 year old with no significant PMH is  at 20w6d who presents for induction and termination of pregnancy in the setting of fetal monosomy X and multiple anomalies including hydrops, L ventriculomegaly, FGR, and cystic hygroma. Patient induced with vaginal delivery and termination of pregnancy earlier today with spontaneous placenta delivery. Later OB/GYN returned and performed sweep evacuation of significant clot burden (EBL ~ 1070mL) and fundal massage. OB/GYN again returned for bleeding (EBL ~ 1100 mL) requiring trip to OR for D&C with EUA. It appears suction curettage did not improve PPH despite uterotonic's and coagulopathy correction. Patient now admitted [facility] ICU 24 for PPH s/p hysterectomy, mechanical ventilation, resuscitation, and  hemodynamic monitoring.    REVIEW OF SYSTEMS: See HPI above    PAST MEDICAL HISTORY:   History reviewed. No pertinent past medical history.  SURGICAL HISTORY:  History reviewed. No pertinent surgical history.  SOCIAL HISTORY:  Social History     Socioeconomic History    Marital status:      Spouse name: None    Number of children: None    Years of education: None     Highest education level: None   Tobacco Use    Smoking status: Never    Smokeless tobacco: Never   Substance and Sexual Activity    Alcohol use: Not Currently    Drug use: Never    Sexual activity: Yes     Partners: Male     FAMILY HISTORY:   History reviewed. No pertinent family history.  ALLERGIES:   No Known Allergies  MEDICATIONS:  Current Facility-Administered Medications   Medication Dose Route Frequency Provider Last Rate Last Admin    acetaminophen (TYLENOL) tablet 650 mg  650 mg Oral Q4H PRN Osiris Raphael MD        benzocaine-menthol (DERMOPLAST) topical spray   Topical 4x Daily PRN Osiris Raphael MD        bisacodyl (DULCOLAX) suppository 10 mg  10 mg Rectal Daily PRN Osiris Raphael MD        carboprost (HEMABATE) injection 250 mcg  250 mcg Intramuscular Q15 Min PRN Osiris Raphael MD   250 mcg at 04/25/24 2027    docusate sodium (COLACE) capsule 100 mg  100 mg Oral Daily Osiris Raphael MD        fentaNYL (PF) (SUBLIMAZE) injection 100 mcg  100 mcg Intravenous Q1H PRN Melyssa Roca MD   100 mcg at 04/25/24 2029    hydrocortisone (Perianal) (ANUSOL-HC) 2.5 % cream   Rectal TID PRN Osiris Raphael MD        [START ON 4/26/2024] HYDROmorphone (DILAUDID) 6 mcg/mL, BUPivacaine (MARCAINE) 0.0625 % in sodium chloride 0.9 % 250 mL EPIDURAL Infusion   EPIDURAL Continuous Carl Dimas Jr., MD        ketorolac (TORADOL) injection 30 mg  30 mg Intravenous Once PRN Xiao Clark MD   30 mg at 04/25/24 1917    Or    ketorolac (TORADOL) injection 30 mg  30 mg Intramuscular Once PRN Xiao Clark MD        Or    ibuprofen (ADVIL/MOTRIN) tablet 800 mg  800 mg Oral Once PRN Xiao Clark MD        ibuprofen (ADVIL/MOTRIN) tablet 800 mg  800 mg Oral Q6H PRN Osiris Raphael MD        lactated ringers infusion   Intravenous Continuous Melyssa Roca MD   Stopped at 04/25/24 1847    lanolin cream   Topical Q1H PRN Osiris Raphael MD        lidocaine 1 % 0.1-20 mL   0.1-20 mL Subcutaneous Once PRN Xiao Clark MD        loperamide (IMODIUM) capsule 2 mg  2 mg Oral Q2H PRN Osiris Raphael MD        methylergonovine (METHERGINE) injection 200 mcg  200 mcg Intramuscular Q2H PRN Osiris Raphael MD   200 mcg at 04/25/24 1931    misoprostol (CYTOTEC) tablet 400 mcg  400 mcg Oral ONCE PRN REPEAT PER INSTRUCTIONS Osiris Raphael MD        Or    misoprostol (CYTOTEC) tablet 800 mcg  800 mcg Rectal ONCE PRN REPEAT PER INSTRUCTIONS Osiris Raphael MD        nalbuphine (NUBAIN) injection 2.6-5 mg  2.6-5 mg Intravenous Q6H PRN Watson Aguilera MD        nalbuphine (NUBAIN) injection 2.6-5 mg  2.6-5 mg Intravenous Q6H PRN Carl Dimas Jr., MD        No MMR Needed - Assessment: Patient does not need MMR vaccine   Does not apply Continuous PRN Osiris Raphael MD        No Tdap Needed - Assessment: Patient does not need Tdap vaccine   Does not apply Continuous PRN Osiris Raphael MD        oxytocin (PITOCIN) 30 units in 500 mL 0.9% NaCl infusion  100-340 mL/hr Intravenous Continuous PRN Xiao Clark  mL/hr at 04/25/24 2048 150 mL/hr at 04/25/24 2048    oxytocin (PITOCIN) 30 units in 500 mL 0.9% NaCl infusion  340 mL/hr Intravenous Continuous PRN Osiris Raphael MD        oxytocin (PITOCIN) injection 10 Units  10 Units Intramuscular Once PRN Xiao Clark MD        oxytocin (PITOCIN) injection 10 Units  10 Units Intramuscular Once PRN Osiris Raphael MD           PHYSICAL EXAMINATION:  Temp:  [97.5  F (36.4  C)-101.4  F (38.6  C)] 101.4  F (38.6  C)  Resp:  [18-24] 24  BP: ()/(41-70) 109/53  SpO2:  [97 %-100 %] 98 %      EYES: PERRL, Anicteric sclera.   HEENT:  Normocephalic, atraumatic, trachea midline, ETT secure, Pupils PERRLA  CV: RRR, no gallops, rubs, or murmurs  PULM/CHEST: Clear breath sounds bilaterally without rhonchi, crackles or wheeze, symmetric chest rise  GI: normal bowel sounds, soft, Abdominal incision CDI  : schulte  catheter in place, urine yellow and clear  EXTREMITIES: No peripheral edema, moving all extremities, peripheral pulses intact  NEURO: Sedated/paralyzed  SKIN: Abd midline incision dressing CDI      LABS: Reviewed.   Arterial Blood Gases   Recent Labs   Lab 04/25/24 2309   PH 7.32*   PCO2 35   PO2 235*   HCO3 18*     Complete Blood Count   Recent Labs   Lab 04/25/24 2309 04/25/24 2247 04/25/24 2138 04/25/24 2127 04/25/24 2122 04/25/24 2001 04/25/24  0841   WBC  --  14.7* 9.5  --   --  12.4* 8.4   HGB 9.8* 8.0* 6.7* 8.5*   < > 10.2* 11.8   PLT  --  166 115*  --   --  173 230    < > = values in this interval not displayed.     Basic Metabolic Panel  Recent Labs   Lab 04/25/24 2309 04/25/24 2248 04/25/24 2127 04/25/24 2122    137 138 136   POTASSIUM 4.0 4.4 2.6* 3.5   CHLORIDE  --  109*  --   --    CO2  --  19*  --   --    BUN  --  7.5  --   --    CR  --  0.48*  --   --    * 138* 83 109*     Liver Function Tests  Recent Labs   Lab 04/25/24 2247 04/25/24 2138 04/25/24 2001   INR 1.34* 1.79* 1.23*     Coagulation Profile  Recent Labs   Lab 04/25/24 2247 04/25/24 2138 04/25/24 2001   INR 1.34* 1.79* 1.23*   PTT 36 44* 39*       IMAGING:  No results found for this or any previous visit (from the past 24 hour(s)).

## 2024-04-26 NOTE — PROGRESS NOTES
"Requested to see patient by OB team for possible TAP block. Patient was seen and consented for a TAP block. Patient then changed their mind and declined the procedure, because they \"did not want to be poked anymore\" and pain was moderately managed by her current pain regimen.      Watson Aguilera MD  Anesthesiology Resident   "

## 2024-04-26 NOTE — PROCEDURES
Patient suctioned and electively extubated per physican order. Patient tolerated procedure without any immediate complications. Pt on RA, vitals stable.    Pillo Muhumed, RRT-NPS

## 2024-04-26 NOTE — PROGRESS NOTES
Anesthesia Post-Partum Follow-Up Note After Vaginal Delivery with Epidural    Patient: Naty Joseph    Patient location: Post-partum floor    Anesthesia type: General and Epidural    Subjective  Naty Joseph does not complain of pruritis at this time. She denies weakness, denies paresthesia, denies difficulties breathing, denies nausea or vomiting, and denies headache. Patient was recently extubated so has not ambulated yet but feels like she has full strength. Prescott still in place. Patient endorsed a positive anesthesia experience.    Objective  Respiratory Function (RR / SpO2 / Airway Patency): Satisfactory  Cardiac Function (HR / Rhythm / BP): Satisfactory  Strength and sensation lower extremities: Normal  Site of epidural insertion: No signs of infection or inflammation    Most recent vitals  /58   Pulse 73   Temp 36.7  C (98.1  F) (Axillary)   Resp 14   LMP 2023 (Exact Date)   SpO2 100%   Breastfeeding Unknown     Assessment and plan  Naty Joseph is a 28 year old female  post-partum #1 s/p vaginal delivery which was followed by a hysterectomy in the setting of postpartum hemorrhage. An epidural catheter was successfully inserted and provided labor analgesia via PCEA pump infusing bupivacaine and fentanyl. The patient delivered via  and the epidural catheter was removed this morning.     At this time, there is no1 evidence of adverse side effects associated with the insertion or removal of the epidural catheter. If the patient develops new lower extremity paresis or paresthesias, or if there are concerns regarding the insertion site of the catheter, please reach out to the anesthesia department OB division [phone]    Thank you for including us in the care for this patient.    Watson Aguilera MD  Anesthesiology Resident

## 2024-04-26 NOTE — OP NOTE
[facility]  OPERATIVE NOTE: DILATION AND CURETTAGE   DATE: 2024  PATIENT: Naty Joseph    SURGEON: Osiris Raphael MD  ASSISTANT(S): Olegario Reardon DO, MS PGY-3, Wiliam Barry MD    PRE-OPERATIVE DIAGNOSIS:   1. Status post spontaneous vaginal delivery following IOL for lethal fetal anomalies  2. Postpartum hemorrhage  3. Severe Coagulopatht    POST-OPERATIVE DIAGNOSIS:   - same and Acute DIC, pregnancy related    PROCEDURE: EUA, Dilation and Curettage, attempted balloon placements    ANESTHESIA: MAC, cervical block    EBL: 600 mL     IVF: 100 mL LR     UOP: Not measured    COMPLICATIONS: None apparent     SPECIMEN(S):  N/a    INDICATIONS: Naty Joseph is a 28 year old  at 22w5d who was admitted to labor and delivery for induction termination in the setting of NIPT + for Monosomy X, and multiple fetal anomolies including fetal growth restriction, cystic hygroma, hydrops, and ventriculomegaly. Induction course began with misoprostol for which patient received 3 doses, after which she quickly had an uncomplicated delivery. Initial . Shortly following delivery attending staff was called back to the room due to increased bleeding, patient given uterotonic including increased IV pitocin, and methergine. TXA also given. Manual sweep was performed and significant amount of clot was evacuated. Despite this patient continued to have ongoing bleeding with fundal massage and bimanual.  Total EBL at this point 1100.  Options for management at this point were discussed with patient including presenting to OR for suction dilation and curettage.  She was amenable to this.  Risks of procedure were reviewed with the patient including risk of bleeding, infection, damage to nearby structures.  Patient amenable to blood transfusion in the event of life-threatening bleeding or acute blood loss anemia.  Consent forms were signed.     FINDINGS:   BSUS used  to visualize uterus with significant clot burden. On EUA, No evidence of cervical laceration. Suction curettage performed with ultrasound guidance did not improve the PPH. Despite multiple uterotonics and administration of blood products bleeding persisted    PROCEDURE:   The patient was taken to the operating room where epidural was bolused and found to be adequate for anesthesia. She was then placed in the dorsal lithotomy position. Request was made for patient to be administered ancef. STAT labs requested. An EUA was performed with the above listed findings.  The patient was then prepped and draped in the usual sterile fashion and a safety timeout performed.    An open-sided speculum was placed into the vagina and the anterior lip of the cervix grasped with a ring forcep.  The cervix was open and dilated with moderate amount of bright red ongoing bleeding. A 12 mm firm, curved suction cannula was inserted and used to evacuate uterus of its contents. 2 passes were made, then switched to a 10 mm firm curved suction cannula. 3 more additional passes were made, though there was quick reaccumulation of blood clots due to ongoing bleeding. Bimanual massage was performed. Attempt was made to place a schulte into uterine cavity and fill to 75ml, though this expulsed spontaneously very shortly after placement. Opal balloon was too large as well as Bakri. Due to persistent ongoing bleeding despite uterotonic's, D&C, massage, and attempted balloon tamponade, and in the setting of rising EBL with concern for possible DIC, decision was made to proceed to hysterectomy.     Patient was quickly updated on findings, and recommendation to proceed to hysterectomy. Risks of this were again briefly reviewed with her and she provided verbal consent to proceed. Anesthesia team then began induction process. Please see Gynecology Oncology operative note for hysterectomy portion of case.      Dr. Raphael was scrubbed and  present for the  entire case.     Olegario Reardno DO, MS  Obstetrics, Gynecology & Women's Health   Resident, PGY-3  04/26/2024 1:12 AM    I was present and scrubbed through the entirety of the case.   I have reviewed and edited this note.   Osiris Raphael MD

## 2024-04-27 LAB
ALBUMIN SERPL BCG-MCNC: 2.7 G/DL (ref 3.5–5.2)
ALP SERPL-CCNC: 44 U/L (ref 40–150)
ALT SERPL W P-5'-P-CCNC: 16 U/L (ref 0–50)
ANION GAP SERPL CALCULATED.3IONS-SCNC: 7 MMOL/L (ref 7–15)
APTT PPP: 31 SECONDS (ref 22–38)
AST SERPL W P-5'-P-CCNC: 24 U/L (ref 0–45)
BASE EXCESS BLDV CALC-SCNC: 2.1 MMOL/L (ref -3–3)
BILIRUB SERPL-MCNC: 0.5 MG/DL
BUN SERPL-MCNC: 7.2 MG/DL (ref 6–20)
CA-I BLD-MCNC: 4.4 MG/DL (ref 4.4–5.2)
CALCIUM SERPL-MCNC: 7.4 MG/DL (ref 8.6–10)
CHLORIDE SERPL-SCNC: 109 MMOL/L (ref 98–107)
CREAT SERPL-MCNC: 0.56 MG/DL (ref 0.51–0.95)
DEPRECATED HCO3 PLAS-SCNC: 25 MMOL/L (ref 22–29)
EGFRCR SERPLBLD CKD-EPI 2021: >90 ML/MIN/1.73M2
ERYTHROCYTE [DISTWIDTH] IN BLOOD BY AUTOMATED COUNT: 16 % (ref 10–15)
FIBRINOGEN PPP-MCNC: 385 MG/DL (ref 170–490)
GLUCOSE BLDC GLUCOMTR-MCNC: 96 MG/DL (ref 70–99)
GLUCOSE SERPL-MCNC: 92 MG/DL (ref 70–99)
HCO3 BLDV-SCNC: 27 MMOL/L (ref 21–28)
HCT VFR BLD AUTO: 23.3 % (ref 35–47)
HGB BLD-MCNC: 7.7 G/DL (ref 11.7–15.7)
HGB BLD-MCNC: 7.8 G/DL (ref 11.7–15.7)
HGB BLD-MCNC: 8.1 G/DL (ref 11.7–15.7)
HOLD SPECIMEN: NORMAL
INR PPP: 1.09 (ref 0.85–1.15)
LACTATE SERPL-SCNC: 0.3 MMOL/L (ref 0.7–2)
MAGNESIUM SERPL-MCNC: 1.9 MG/DL (ref 1.7–2.3)
MCH RBC QN AUTO: 29 PG (ref 26.5–33)
MCHC RBC AUTO-ENTMCNC: 33.6 G/DL (ref 31.5–36.5)
MCV RBC AUTO: 87 FL (ref 78–100)
O2/TOTAL GAS SETTING VFR VENT: 21 %
OXYHGB MFR BLDV: 94 % (ref 70–75)
PCO2 BLDV: 40 MM HG (ref 40–50)
PH BLDV: 7.43 [PH] (ref 7.32–7.43)
PHOSPHATE SERPL-MCNC: 3.4 MG/DL (ref 2.5–4.5)
PLATELET # BLD AUTO: 92 10E3/UL (ref 150–450)
PO2 BLDV: 74 MM HG (ref 25–47)
POTASSIUM SERPL-SCNC: 3.3 MMOL/L (ref 3.4–5.3)
POTASSIUM SERPL-SCNC: 3.6 MMOL/L (ref 3.4–5.3)
PROT SERPL-MCNC: 4.4 G/DL (ref 6.4–8.3)
RBC # BLD AUTO: 2.69 10E6/UL (ref 3.8–5.2)
SAO2 % BLDV: 96.7 % (ref 70–75)
SODIUM SERPL-SCNC: 141 MMOL/L (ref 135–145)
WBC # BLD AUTO: 6.1 10E3/UL (ref 4–11)

## 2024-04-27 PROCEDURE — 85384 FIBRINOGEN ACTIVITY: CPT

## 2024-04-27 PROCEDURE — 82805 BLOOD GASES W/O2 SATURATION: CPT

## 2024-04-27 PROCEDURE — 250N000013 HC RX MED GY IP 250 OP 250 PS 637

## 2024-04-27 PROCEDURE — 99232 SBSQ HOSP IP/OBS MODERATE 35: CPT | Mod: GC | Performed by: OBSTETRICS & GYNECOLOGY

## 2024-04-27 PROCEDURE — 250N000011 HC RX IP 250 OP 636

## 2024-04-27 PROCEDURE — 82330 ASSAY OF CALCIUM: CPT

## 2024-04-27 PROCEDURE — 85610 PROTHROMBIN TIME: CPT

## 2024-04-27 PROCEDURE — 80053 COMPREHEN METABOLIC PANEL: CPT | Performed by: OBSTETRICS & GYNECOLOGY

## 2024-04-27 PROCEDURE — 36415 COLL VENOUS BLD VENIPUNCTURE: CPT

## 2024-04-27 PROCEDURE — 93005 ELECTROCARDIOGRAM TRACING: CPT

## 2024-04-27 PROCEDURE — 99232 SBSQ HOSP IP/OBS MODERATE 35: CPT | Mod: 24

## 2024-04-27 PROCEDURE — 83735 ASSAY OF MAGNESIUM: CPT | Performed by: OBSTETRICS & GYNECOLOGY

## 2024-04-27 PROCEDURE — 83605 ASSAY OF LACTIC ACID: CPT

## 2024-04-27 PROCEDURE — 85018 HEMOGLOBIN: CPT

## 2024-04-27 PROCEDURE — 93010 ELECTROCARDIOGRAM REPORT: CPT | Performed by: INTERNAL MEDICINE

## 2024-04-27 PROCEDURE — 85730 THROMBOPLASTIN TIME PARTIAL: CPT

## 2024-04-27 PROCEDURE — 250N000013 HC RX MED GY IP 250 OP 250 PS 637: Performed by: OBSTETRICS & GYNECOLOGY

## 2024-04-27 PROCEDURE — 84132 ASSAY OF SERUM POTASSIUM: CPT

## 2024-04-27 PROCEDURE — 120N000002 HC R&B MED SURG/OB UMMC

## 2024-04-27 PROCEDURE — 84100 ASSAY OF PHOSPHORUS: CPT | Performed by: OBSTETRICS & GYNECOLOGY

## 2024-04-27 RX ORDER — ACETAMINOPHEN 325 MG/1
975 TABLET ORAL EVERY 8 HOURS
Status: DISCONTINUED | OUTPATIENT
Start: 2024-04-27 | End: 2024-04-29 | Stop reason: HOSPADM

## 2024-04-27 RX ORDER — HYDROXYZINE HYDROCHLORIDE 25 MG/1
25 TABLET, FILM COATED ORAL EVERY 6 HOURS PRN
Status: DISCONTINUED | OUTPATIENT
Start: 2024-04-27 | End: 2024-04-29 | Stop reason: HOSPADM

## 2024-04-27 RX ORDER — IBUPROFEN 800 MG/1
800 TABLET, FILM COATED ORAL EVERY 6 HOURS
Status: DISCONTINUED | OUTPATIENT
Start: 2024-04-27 | End: 2024-04-29 | Stop reason: HOSPADM

## 2024-04-27 RX ORDER — HYDROMORPHONE HYDROCHLORIDE 1 MG/ML
0.3 INJECTION, SOLUTION INTRAMUSCULAR; INTRAVENOUS; SUBCUTANEOUS EVERY 4 HOURS PRN
Status: DISCONTINUED | OUTPATIENT
Start: 2024-04-28 | End: 2024-04-29 | Stop reason: HOSPADM

## 2024-04-27 RX ORDER — LIDOCAINE 4 G/G
3 PATCH TOPICAL
Status: DISCONTINUED | OUTPATIENT
Start: 2024-04-27 | End: 2024-04-29 | Stop reason: HOSPADM

## 2024-04-27 RX ORDER — HYDROXYZINE HYDROCHLORIDE 50 MG/1
50 TABLET, FILM COATED ORAL EVERY 6 HOURS PRN
Status: DISCONTINUED | OUTPATIENT
Start: 2024-04-27 | End: 2024-04-29 | Stop reason: HOSPADM

## 2024-04-27 RX ORDER — POTASSIUM CHLORIDE 1500 MG/1
20 TABLET, EXTENDED RELEASE ORAL ONCE
Status: COMPLETED | OUTPATIENT
Start: 2024-04-27 | End: 2024-04-27

## 2024-04-27 RX ORDER — OXYCODONE HYDROCHLORIDE 5 MG/1
5-10 TABLET ORAL EVERY 4 HOURS PRN
Status: DISCONTINUED | OUTPATIENT
Start: 2024-04-27 | End: 2024-04-29 | Stop reason: HOSPADM

## 2024-04-27 RX ORDER — MAGNESIUM OXIDE 400 MG/1
400 TABLET ORAL EVERY 4 HOURS
Status: COMPLETED | OUTPATIENT
Start: 2024-04-27 | End: 2024-04-27

## 2024-04-27 RX ORDER — POTASSIUM CHLORIDE 1500 MG/1
40 TABLET, EXTENDED RELEASE ORAL ONCE
Status: COMPLETED | OUTPATIENT
Start: 2024-04-27 | End: 2024-04-27

## 2024-04-27 RX ADMIN — IBUPROFEN 800 MG: 800 TABLET, FILM COATED ORAL at 09:58

## 2024-04-27 RX ADMIN — MAGNESIUM OXIDE TAB 400 MG (241.3 MG ELEMENTAL MG) 400 MG: 400 (241.3 MG) TAB at 09:02

## 2024-04-27 RX ADMIN — POTASSIUM CHLORIDE 20 MEQ: 1500 TABLET, EXTENDED RELEASE ORAL at 15:10

## 2024-04-27 RX ADMIN — IBUPROFEN 800 MG: 800 TABLET, FILM COATED ORAL at 15:10

## 2024-04-27 RX ADMIN — OXYCODONE HYDROCHLORIDE 5 MG: 5 TABLET ORAL at 03:12

## 2024-04-27 RX ADMIN — POLYETHYLENE GLYCOL 3350 17 G: 17 POWDER, FOR SOLUTION ORAL at 09:02

## 2024-04-27 RX ADMIN — OXYCODONE HYDROCHLORIDE 5 MG: 5 TABLET ORAL at 15:10

## 2024-04-27 RX ADMIN — ACETAMINOPHEN 975 MG: 325 TABLET, FILM COATED ORAL at 15:10

## 2024-04-27 RX ADMIN — MAGNESIUM OXIDE TAB 400 MG (241.3 MG ELEMENTAL MG) 400 MG: 400 (241.3 MG) TAB at 06:05

## 2024-04-27 RX ADMIN — POTASSIUM CHLORIDE 40 MEQ: 1500 TABLET, EXTENDED RELEASE ORAL at 06:05

## 2024-04-27 RX ADMIN — LIDOCAINE 3 PATCH: 4 PATCH TOPICAL at 09:01

## 2024-04-27 RX ADMIN — ACETAMINOPHEN 650 MG: 325 TABLET, FILM COATED ORAL at 09:02

## 2024-04-27 RX ADMIN — OXYCODONE HYDROCHLORIDE 5 MG: 5 TABLET ORAL at 09:02

## 2024-04-27 RX ADMIN — HYDROMORPHONE HYDROCHLORIDE 0.3 MG: 1 INJECTION, SOLUTION INTRAMUSCULAR; INTRAVENOUS; SUBCUTANEOUS at 06:12

## 2024-04-27 RX ADMIN — ACETAMINOPHEN 650 MG: 325 TABLET, FILM COATED ORAL at 03:12

## 2024-04-27 RX ADMIN — HYDROMORPHONE HYDROCHLORIDE 0.3 MG: 1 INJECTION, SOLUTION INTRAMUSCULAR; INTRAVENOUS; SUBCUTANEOUS at 10:07

## 2024-04-27 RX ADMIN — DOCUSATE SODIUM 100 MG: 100 CAPSULE, LIQUID FILLED ORAL at 09:02

## 2024-04-27 RX ADMIN — HYDROXYZINE HYDROCHLORIDE 25 MG: 25 TABLET, FILM COATED ORAL at 22:03

## 2024-04-27 RX ADMIN — HYDROMORPHONE HYDROCHLORIDE 0.3 MG: 1 INJECTION, SOLUTION INTRAMUSCULAR; INTRAVENOUS; SUBCUTANEOUS at 20:57

## 2024-04-27 RX ADMIN — ENOXAPARIN SODIUM 40 MG: 40 INJECTION SUBCUTANEOUS at 15:10

## 2024-04-27 ASSESSMENT — ACTIVITIES OF DAILY LIVING (ADL)
ADLS_ACUITY_SCORE: 26
ADLS_ACUITY_SCORE: 28
ADLS_ACUITY_SCORE: 32
ADLS_ACUITY_SCORE: 26
ADLS_ACUITY_SCORE: 32
ADLS_ACUITY_SCORE: 26
ADLS_ACUITY_SCORE: 28
ADLS_ACUITY_SCORE: 32
ADLS_ACUITY_SCORE: 28
ADLS_ACUITY_SCORE: 32
ADLS_ACUITY_SCORE: 26
ADLS_ACUITY_SCORE: 32
ADLS_ACUITY_SCORE: 28
ADLS_ACUITY_SCORE: 32
ADLS_ACUITY_SCORE: 28
ADLS_ACUITY_SCORE: 32
ADLS_ACUITY_SCORE: 28
ADLS_ACUITY_SCORE: 28

## 2024-04-27 NOTE — PLAN OF CARE
Problem: Pain Acute  Goal: Optimal Pain Control and Function  Outcome: Progressing  Intervention: Develop Pain Management Plan  Recent Flowsheet Documentation  Taken 4/26/2024 2056 by Vanessa Alvarado RN  Pain Management Interventions: medication (see MAR)  Intervention: Prevent or Manage Pain  Recent Flowsheet Documentation  Taken 4/26/2024 2000 by Vanessa Alvarado RN  Medication Review/Management: medications reviewed    10A ICU End of Shift Summary.     Changes this shift: IV fluid discontinued. Phenylephrine discontinued. Lab drawn. Potassium and magnesium replaced.     Neuro: A/O x 4. Able to make needs known.   Cardiac: SR to ST at 100s. MAP maintained >65.   Respiratory: On room air, maintaining O2 sats >94%.    GI/: No BM this shift. Prescott, patent draining clear, dawson urine.   Diet/appetite: Regular.   Pain: Pain on abdomen/incision, pain score of 7-9/10, PRN Oxycodone and Tylenol and IV Dilaudid given.   Skin: Dressing on abdomen CDI. Abdominal binder present.   LDA's: PIV x 2. Prescott.        Plan: Pain control. Monitor for bleeding.     For complete vital signs and assessments please see flowsheets.

## 2024-04-27 NOTE — PLAN OF CARE
Problem: Adult Inpatient Plan of Care  Goal: Plan of Care Review  Description: The Plan of Care Review/Shift note should be completed every shift.  The Outcome Evaluation is a brief statement about your assessment that the patient is improving, declining, or no change.  This information will be displayed automatically on your shift  note.  Outcome: Progressing   Goal Outcome Evaluation:    10A ICU End of Shift Summary.     Changes this shift: downgraded from ICU status, plan to transfer to med surg    Neuro: A&Ox4  Cardiac:  VSS.  BP WNL.  SR.  Tele discontinued  Respiratory: LS clear.  On RA.   GI/: No BM this shift, bowel meds given.  Prescott removed at 1300, has been able to void on commode since then.  Diet/appetite: Minimal appetite.  Regular diet.  Oral intake encouraged.  Pain: Reporting pain 6-7/10 in abdomen, improvement with lidocaine patches, tylenol, ibuprofen, oxycodone, diluadid, and abdominal binder  Skin: abdominal incision c/d/i  LDA's: PIV x2.  Activities: Up to commode x1 with 2 assist, very painful.  Turns q2 while in bed.    Drips: n/a       Plan:  Monitor oral intake.  Pain management.  Transfer out of ICU.  Continue plan of care.

## 2024-04-27 NOTE — PROGRESS NOTES
Post Partum Progress Note  PPD#3    Subjective:  She sleeping in bed this morning. She complains of band like heaviness shoulder to shoulder, which is exacerbated by ambulation. Not reproducible on exam.  Denies any SOB. Pain is improving and well controlled on current medication regimen. She is tolerating PO intake. She is voiding without difficulty. She has passed flatus and has not had a BM. She is ambulating without dizziness or difficulty.  She denies headache, changes in vision, nausea/vomiting.     Objective:  Vitals:    24 0700 24 0800 24 0900 24 1000   BP: 95/57 96/56 98/68 103/77   BP Location:  Left arm     Cuff Size:       Pulse: 89 90 82 104   Resp: 15 14 19 19   Temp:  98.7  F (37.1  C)     TempSrc:  Oral     SpO2: 96% 97% 97% 99%   Weight:           General: NAD. A&Ox3.  CV: Regular rate, well perfused.   Pulm: CTAB  Abd: Soft, non-tender, non-distended.   Incision: VML incision with bandage in place   Ext: trace lower extremity edema bilaterally. No calf tenderness. SCDs in place and running     Assessment/Plan:  Naty Joseph is a 28 year old  female who is PPD#3 s/p  resulting in hemorrhage with DIC and now POD#3 from gravid IOANA. S/p ICU stay, now stable.     # DIC  # Postpartum hemorrhage   - IOT course uncomplicated requiring only 3x vaginal misoprostol  - Unclear etiology of hemorrhage; however, could consider mirror syndrome in the setting of fetal hydrops   - S/p ICU stay; stable overnight   - Hgb this AM 7.5; platelets 109; fibrinogen and INR normalized     -- Total EBL: 3227. Deliv EBL 1222 (hema, TXA, mthg, miso) >       -- Total prods: 5u pRBC, 5u Cryo, 4u FFP, 1u Plts  - Consider IV iron this am     #Chest Heaviness  Exacerbated by walking. Denies any weakness or SOB. O2 saturations normal and patient was able to sleep overnight after hydroxyzine. EKG NSR. Lungs were clear, however decreased effort likely secondary to pain. RN to bring in IS. Vitals  normal with normal 02 saturations. Low suspicion for cardiac etiology. Possible MSK or component of atelectasis and anemia.   - Encourage IS and increase in ambulation today    # Routine post delivery management   - PNC: Rh positive. No intervention indicated.  - Pain: controlled on oral medications   - GI: continue anti-emetics and stool softeners as needed.  - : Voiding spontaneously .    Discharge to home pending clinical stability.     Harika Katz MD  OB/GYN Resident, PGY-3

## 2024-04-27 NOTE — PROGRESS NOTES
[facility] ICU PROGRESS NOTE  2024    Date of Service (when I saw the patient): 2024    ASSESSMENT: Naty Joseph is a 28 year old with no significant PMH is  at 20w6d who presents for induction and termination of pregnancy in the setting of fetal monosomy X and multiple anomalies including hydrops, L ventriculomegaly, FGR, and cystic hygroma. Patient presented for IOL and termination of pregnancy now complicated by PPH requiring trip to OR for suction and currettage with EUA and mechanical ventilation. Patient now admitted [facility] ICU  for PPH s/p hysterectomy, resuscitation, hemodynamic monitoring and mechanical ventilation.     Aleida-op: EBL ~ 3127 mL, 3 uFFP given, 5 uPRBC, 5 units cryoprecipitate, 1 Platelet, Ca+ Chloride 1 gm x 2 given, 2L crystalloid, 5% 12.5 gm albumin, and phenylephrine infusion.     PLAN:    Changes in Plan:  - Mobilize  - Transfer to postpartum     Neurological:  # Acute post-operative pain  - Scheduled tylenol and ibuprofen   - PRN oxycodone and IV dilaudid   - Lidocaine patches added for incisional pain     Pulmonary:   # Post operative ventilatory support, resolved   - Supplemental oxygen to keep saturation above 92 %.  - IS when awake     Cardiovascular:    # Hemorrhagic shock, resolved   - Phenylephrine infusion off since  at 1700  - BP Goals: MAP > 65, and SBP < 160    Gastroenterology/Nutrition:  No acute concerns  - Daily CMP   - Bowel regimen ordered  - Diet: Regular     Renal/Fluids/Electrolytes:   # Hypomagnesia  # Hypocalcemia  # Hypokalemia   # Compensated metabolic acidosis 2/2 hemorrhagic shock, resolved   - Daily CMP  - RN managed electrolyte replacements  - Accurate I/Os and daily weights   - Remove schulte today     OB/GYN:  # Pregnancy complicated by fetus with lethal anomalies  # IOL with pregnancy termination and vaginal delivery on 24  # PPH s/p hysterectomy on 24  Patient induced for termination of pregnancy on  with  spontaneous placenta delivery. Later OB/GYN returned and performed sweep evacuation of significant clot burden (EBL ~ 1070mL) and fundal massage. OB/GYN again returned for bleeding (EBL ~ 1100 mL) requiring trip to OR for suction curettage with EUA. It appears suction curettage did not improve PPH despite uterotonic's and coagulopathy correction requiring hysterectomy.  - OB/gyn primary team   - Lochia monitoring per OB    Endocrine:  No acute concerns  - Goal to keep BG< 180 for optimal wound healing   - Hypoglycemia order set in place     ID:  No acute concerns  - Aleida-op antibiotic regimen per OB (flagyl & Cefazolin given)  - CTM fever curve and WBC and signs of infection    Heme:     # Stress induced leukocytosis  # Acute blood loss anemia    # Coag defect 2/2 pregnancy  - Transfuse if hgb <7.0 or signs/symptoms of hypoperfusion. Monitor and trend  - CBC daily, hgb BID     Musculoskeletal/Skin:  # Weakness and deconditioning of critical illness  # Midline abdominal incision  - Physical and occupational therapy consult  - Diligent RN cares    General Cares/Prophylaxis:  DVT Prophylaxis: Lovenox   GI Prophylaxis: NA  Restraints: NA   Family Communication: Patient updated   Code Status: Full code    Lines/tubes/drains:  - PIV  - [place]- remove today     Disposition:  - Transfer to med/surge floor     Patient seen and findings/plan discussed with medical ICU staff, Dr. Sánchez.     SHAJI Olvera CNP    Non-critical care time: 35 min   ====================================  INTERVAL HISTORY:   No acute changes overnight. Patient's hemoglobin stable. Weaned off phenylephrine yesterday evening. Encourage mobilization today. Plan to transfer to /S today.     OBJECTIVE:   1. VITAL SIGNS:   Temp:  [98.4  F (36.9  C)-99  F (37.2  C)] 99  F (37.2  C)  Pulse:  [] 90  Resp:  [10-25] 16  BP: ()/(49-90) 99/62  MAP:  [62 mmHg-293 mmHg] 293 mmHg  Arterial Line BP: (61-78)/(56-72) 73/67  SpO2:  [95 %-100 %] 97  %  Vent Mode: (S) CPAP/PS  (Continuous positive airway pressure with Pressure Support) (Vent mode changed by provider)  FiO2 (%): 21 %  Resp Rate (Set): 15 breaths/min  Tidal Volume (Set, mL): 340 mL  PEEP (cm H2O): 5 cmH2O  Pressure Support (cm H2O): 5 cmH2O  Resp: 16    2. INTAKE/ OUTPUT:   I/O last 3 completed shifts:  In: 2474.44 [I.V.:1354.44; NG/GT:120; IV Piggyback:1000]  Out: 3395 [Urine:3395]    3. PHYSICAL EXAMINATION:  PHYSICAL EXAM  General: Lying supine in bed, in no acute distress  Skin:  Pale, warm and dry.   Neuro: A/Ox4, following commands, equal strength, no focal deficits   ENT: PERRL, EOMI, no nystagmus, anicteric  Heart: Regular rate and rhythm, no murmurs, rubs, or gallops appreciated  Lungs: coarse BL , unlabored breathing on RA   Abdomen: Soft, nondistended, nontender, BS x4  Extremities: No edema, capillary refill <3 sec BL upper and lower extremities.  Mental:  Appropriate     4. LABS:   Arterial Blood Gases   Recent Labs   Lab 04/26/24  0119 04/25/24  2352 04/25/24  2309 04/25/24  2243   PH 7.39 7.35 7.32* 7.34*   PCO2 35 37 35 35   PO2 206* 221* 235* 233*   HCO3 21 20* 18* 19*     Complete Blood Count   Recent Labs   Lab 04/27/24  0438 04/26/24  1820 04/26/24  1405 04/26/24  1217 04/26/24  0630 04/26/24  0120   WBC  --  6.1  --  7.1 7.7 7.5   HGB 7.7* 7.8*  7.8* 8.1* 8.3* 9.4* 9.1*  9.1*   PLT  --  92*  --  103* 108* 77*     Basic Metabolic Panel  Recent Labs   Lab 04/27/24  0438 04/27/24  0349 04/26/24  2347 04/26/24  2027 04/26/24  1626 04/26/24  1217 04/26/24  0803 04/26/24  0630 04/26/24  0405 04/26/24  0120     --   --   --   --  142  --  139  --  137   POTASSIUM 3.3*  --   --   --   --  3.4  --  3.7  --  3.9   CHLORIDE 109*  --   --   --   --  110*  --  108*  --  107   CO2 25  --   --   --   --  26  --  24  --  20*   BUN 7.2  --   --   --   --  4.6*  --  5.4*  --  6.2   CR 0.56  --   --   --   --  0.56  --  0.51  --  0.46*   GLC 92 96 97 82   < > 78   < > 98   < > 106*    <  > = values in this interval not displayed.     Liver Function Tests  Recent Labs   Lab 04/27/24  0438 04/26/24  1217 04/26/24  0630 04/26/24  0120 04/25/24  2353 04/25/24  2247   AST 24 25 25 26  --   --    ALT 16 17 16 18  --   --    ALKPHOS 44 44 47 49  --   --    BILITOTAL 0.5 1.0 1.1 1.4*  --   --    ALBUMIN 2.7* 2.8* 2.9* 2.9*  --   --    INR 1.09  --   --  1.12 1.22* 1.34*     Coagulation Profile  Recent Labs   Lab 04/27/24  0438 04/26/24  0120 04/25/24 2353 04/25/24 2247   INR 1.09 1.12 1.22* 1.34*   PTT 31 39* 27 36       5. RADIOLOGY:   No results found for this or any previous visit (from the past 24 hour(s)).

## 2024-04-27 NOTE — PROGRESS NOTES
[facility]   Post-partum Progress Note    Name:  Naty Joseph  MRN: [id number]    S: Patient doing ok today. Having some incisional pain moderately controlled with pain medication. Has not ambulated yet. Voiding via schulte catheter with good urinary output. Tolerating small po intake without nausea or emesis. No other concerns today.     O:   Patient Vitals for the past 24 hrs:   BP Temp Temp src Pulse Resp SpO2 Weight   04/27/24 0600 99/62 -- -- 90 16 97 % 74.1 kg (163 lb 5.8 oz)   04/27/24 0500 103/58 -- -- 94 17 95 % --   04/27/24 0400 99/58 99  F (37.2  C) Oral 94 20 96 % --   04/27/24 0300 106/52 -- -- 105 20 96 % --   04/27/24 0200 102/58 -- -- 105 19 97 % --   04/27/24 0100 106/60 -- -- 98 18 96 % --   04/27/24 0000 109/73 -- -- 101 20 97 % --   04/26/24 2330 108/62 98.6  F (37  C) Oral 105 19 97 % --   04/26/24 2315 105/61 -- -- 93 17 97 % --   04/26/24 2300 104/65 -- -- 96 16 96 % --   04/26/24 2245 105/63 -- -- 90 18 97 % --   04/26/24 2230 103/65 -- -- 79 14 97 % --   04/26/24 2215 111/67 -- -- 93 22 99 % --   04/26/24 2200 103/63 -- -- 97 21 99 % --   04/26/24 2145 105/65 -- -- 101 24 100 % --   04/26/24 2130 111/66 -- -- 108 23 99 % --   04/26/24 2115 107/62 -- -- 80 19 98 % --   04/26/24 2100 102/63 -- -- 89 21 100 % --   04/26/24 2056 102/63 -- -- 93 21 99 % --   04/26/24 2045 102/63 -- -- 94 19 99 % --   04/26/24 2030 98/60 -- -- 81 16 100 % --   04/26/24 2015 105/60 -- -- 103 25 98 % --   04/26/24 2000 107/57 98.4  F (36.9  C) Oral 99 19 96 % --   04/26/24 1945 103/56 -- -- 97 19 97 % --   04/26/24 1930 103/63 -- -- 86 18 96 % --   04/26/24 1915 102/52 -- -- 82 18 96 % --   04/26/24 1900 102/57 -- -- 85 15 97 % --   04/26/24 1830 107/62 -- -- 102 22 97 % --   04/26/24 1815 101/59 -- -- 95 19 97 % --   04/26/24 1800 106/51 -- -- 97 18 95 % --   04/26/24 1750 -- -- -- 108 12 97 % --   04/26/24 1745 114/60 -- -- 103 16 97 % --   04/26/24 1730 110/61 -- -- 88 18 97 % --    04/26/24 1715 108/57 -- -- 91 18 97 % --   04/26/24 1700 107/56 -- -- 83 15 97 % --   04/26/24 1645 99/84 -- -- 82 18 98 % --   04/26/24 1630 (!) 109/90 -- -- 91 17 100 % --   04/26/24 1615 107/51 -- -- 77 16 100 % --   04/26/24 1600 106/54 99  F (37.2  C) Oral 68 16 100 % --   04/26/24 1545 114/57 -- -- 69 20 100 % --   04/26/24 1530 114/67 -- -- 80 22 99 % --   04/26/24 1515 112/59 -- -- 70 18 100 % --   04/26/24 1500 115/63 -- -- 70 17 99 % --   04/26/24 1445 116/50 -- -- 63 16 99 % --   04/26/24 1430 117/53 -- -- 68 19 100 % --   04/26/24 1415 111/59 -- -- 74 20 100 % --   04/26/24 1400 103/58 -- -- 59 15 100 % --   04/26/24 1345 106/63 -- -- 56 18 100 % --   04/26/24 1330 102/53 -- -- 66 20 98 % --   04/26/24 1315 105/57 -- -- 65 24 100 % --   04/26/24 1300 99/68 -- -- 81 18 99 % --   04/26/24 1245 104/57 -- -- 62 17 100 % --   04/26/24 1230 109/55 -- -- 62 20 100 % --   04/26/24 1215 100/54 -- -- 69 18 100 % --   04/26/24 1200 99/61 98.9  F (37.2  C) Oral 70 18 98 % --   04/26/24 1145 99/63 -- -- 68 17 98 % --   04/26/24 1130 108/53 -- -- 69 22 99 % --   04/26/24 1115 102/55 -- -- 77 21 99 % --   04/26/24 1100 (!) 89/49 -- -- 92 18 99 % --   04/26/24 1045 106/58 -- -- 73 14 100 % --   04/26/24 1030 110/53 -- -- 65 17 100 % --   04/26/24 1015 112/53 -- -- 80 19 99 % --   04/26/24 1000 112/56 -- -- 96 18 99 % --   04/26/24 0945 106/62 -- -- 84 13 100 % --   04/26/24 0930 103/53 -- -- 62 18 100 % --   04/26/24 0915 106/59 -- -- 63 15 100 % --   04/26/24 0900 99/59 -- -- 68 18 100 % --   04/26/24 0855 -- -- -- 73 -- 100 % --   04/26/24 0845 102/66 -- -- 60 10 100 % --   04/26/24 0830 108/50 -- -- 61 11 100 % --   04/26/24 0820 -- -- -- 60 -- 100 % --   04/26/24 0815 110/56 -- -- 60 16 100 % --   04/26/24 0800 106/50 98.1  F (36.7  C) Axillary 52 15 98 % --   04/26/24 0745 106/61 -- -- 55 15 100 % --   04/26/24 0740 106/57 -- -- 75 14 96 % --   04/26/24 0730 103/43 -- -- 53 15 98 % --   04/26/24 0715 102/47 -- --  55 15 99 % --   24 0700 107/47 -- -- 58 15 99 % --   24 0645 103/50 -- -- 58 15 100 % --     Gen:  Resting comfortably, NAD  CV:  RR, well perfused  Pulm:  Non-labored breathing on room air  Abd:  Soft, non-distended   Incision: VML incision covered in clean bandage   Ext:  Trace edema, SCDs in place     UOP: 4420 / 360  since midnight        Latest Reference Range & Units 24 01:20 24 06:30 24 12:17   Hematocrit 35.0 - 47.0 % 26.9 (L) 27.6 (L) 24.3 (L)   Platelet Count 150 - 450 10e3/uL 77 (L) 108 (L) 103 (L)   RBC Count 3.80 - 5.20 10e6/uL 3.15 (L) 3.26 (L) 2.87 (L)   MCV 78 - 100 fL 85 85 85   MCH 26.5 - 33.0 pg 28.9 28.8 28.9   MCHC 31.5 - 36.5 g/dL 33.8 34.1 34.2   RDW 10.0 - 15.0 % 14.9 15.5 (H) 16.0 (H)   % Neutrophils %  72 74   (L): Data is abnormally low  (H): Data is abnormally high      Assessment/Plan:  Naty Joseph 28 year old  on PPD#2 s/p  that was complicated by postpartum hemorrhage with DIC ultimately resulting in total abdominal hysterectomy after which patient was admitted to ICU. Patient weaned off pressors. Will plan to transfer to postpartum unit for routine care.    # DIC  # Postpartum hemorrhage  Induction uncomplicated and required only 3 doses of vaginal misoprostol. Subsequent hemorrhage with DIC of unclear etiology though possible that this could represent mirror syndrome in the setting of fetal hydrops. Currently stable in the ICU with stabilization of labs. Abdomen remains non-distended this morning and minimal vaginal spotting overnight. VSS  - Hgb stable at 7.7, plts stable at 103; fibrinogen and INR have normalized.      -- Total EBL: 3227. Deliv EBL 1222 (hema, TXA, mthg, miso) >      -- Total prods: 5u pRBC, 5u Cryo, 4u FFP, 1u Plts    # Post-delivery management   Pain: Routine post-delivery regimen including. Tylenol, Ibuprofen, PRN oxycodone/IV dilaudid. Recommend addition of lidocaine patches for incisional pain  GI:  Advanced diet  as tolerated   : Schulte in place; UOP adequate   Rh: Positive  PPx:  Lovenox ppx    Appreciate ICU cares.    Dispo: Medically Ready for Discharge: Anticipated in 2-4 Days    Wiliam Barry MD, MPH, MS  Obstetrics, Gynecology & Women's Health   Resident, PGY-2  04/27/2024 6:30 AM    Appreciate note by Dr. Barry. Patient has been seen and examined by me separate from the resident, agree with above note. Naty is improving today. Feels pain medications are starting to help. Plan for schulte out today, ambulation. Ready to move out of ICU.     Mariely Duarnd MD  10:16 AM

## 2024-04-28 LAB
ALBUMIN SERPL BCG-MCNC: 2.6 G/DL (ref 3.5–5.2)
ALP SERPL-CCNC: 45 U/L (ref 40–150)
ALT SERPL W P-5'-P-CCNC: 14 U/L (ref 0–50)
ANION GAP SERPL CALCULATED.3IONS-SCNC: 7 MMOL/L (ref 7–15)
AST SERPL W P-5'-P-CCNC: 21 U/L (ref 0–45)
BASOPHILS # BLD AUTO: 0 10E3/UL (ref 0–0.2)
BASOPHILS NFR BLD AUTO: 1 %
BILIRUB SERPL-MCNC: 0.4 MG/DL
BUN SERPL-MCNC: 9.7 MG/DL (ref 6–20)
CALCIUM SERPL-MCNC: 7.7 MG/DL (ref 8.6–10)
CHLORIDE SERPL-SCNC: 109 MMOL/L (ref 98–107)
CREAT SERPL-MCNC: 0.53 MG/DL (ref 0.51–0.95)
DEPRECATED HCO3 PLAS-SCNC: 24 MMOL/L (ref 22–29)
EGFRCR SERPLBLD CKD-EPI 2021: >90 ML/MIN/1.73M2
EOSINOPHIL # BLD AUTO: 0.1 10E3/UL (ref 0–0.7)
EOSINOPHIL NFR BLD AUTO: 3 %
ERYTHROCYTE [DISTWIDTH] IN BLOOD BY AUTOMATED COUNT: 15.6 % (ref 10–15)
GLUCOSE SERPL-MCNC: 92 MG/DL (ref 70–99)
HCT VFR BLD AUTO: 22.9 % (ref 35–47)
HGB BLD-MCNC: 7.5 G/DL (ref 11.7–15.7)
HGB BLD-MCNC: 7.8 G/DL (ref 11.7–15.7)
HOLD SPECIMEN: NORMAL
IMM GRANULOCYTES # BLD: 0 10E3/UL
IMM GRANULOCYTES NFR BLD: 0 %
LYMPHOCYTES # BLD AUTO: 1.4 10E3/UL (ref 0.8–5.3)
LYMPHOCYTES NFR BLD AUTO: 35 %
MAGNESIUM SERPL-MCNC: 1.7 MG/DL (ref 1.7–2.3)
MCH RBC QN AUTO: 29.2 PG (ref 26.5–33)
MCHC RBC AUTO-ENTMCNC: 32.8 G/DL (ref 31.5–36.5)
MCV RBC AUTO: 89 FL (ref 78–100)
MONOCYTES # BLD AUTO: 0.3 10E3/UL (ref 0–1.3)
MONOCYTES NFR BLD AUTO: 7 %
NEUTROPHILS # BLD AUTO: 2.3 10E3/UL (ref 1.6–8.3)
NEUTROPHILS NFR BLD AUTO: 54 %
NRBC # BLD AUTO: 0 10E3/UL
NRBC BLD AUTO-RTO: 0 /100
PHOSPHATE SERPL-MCNC: 3.3 MG/DL (ref 2.5–4.5)
PLATELET # BLD AUTO: 109 10E3/UL (ref 150–450)
POTASSIUM SERPL-SCNC: 3.8 MMOL/L (ref 3.4–5.3)
PROT SERPL-MCNC: 4.4 G/DL (ref 6.4–8.3)
RBC # BLD AUTO: 2.57 10E6/UL (ref 3.8–5.2)
SODIUM SERPL-SCNC: 140 MMOL/L (ref 135–145)
WBC # BLD AUTO: 4.2 10E3/UL (ref 4–11)

## 2024-04-28 PROCEDURE — 99232 SBSQ HOSP IP/OBS MODERATE 35: CPT | Mod: GC | Performed by: STUDENT IN AN ORGANIZED HEALTH CARE EDUCATION/TRAINING PROGRAM

## 2024-04-28 PROCEDURE — 36415 COLL VENOUS BLD VENIPUNCTURE: CPT

## 2024-04-28 PROCEDURE — 120N000002 HC R&B MED SURG/OB UMMC

## 2024-04-28 PROCEDURE — 250N000013 HC RX MED GY IP 250 OP 250 PS 637

## 2024-04-28 PROCEDURE — 250N000013 HC RX MED GY IP 250 OP 250 PS 637: Performed by: OBSTETRICS & GYNECOLOGY

## 2024-04-28 PROCEDURE — 84100 ASSAY OF PHOSPHORUS: CPT | Performed by: OBSTETRICS & GYNECOLOGY

## 2024-04-28 PROCEDURE — 85025 COMPLETE CBC W/AUTO DIFF WBC: CPT | Performed by: OBSTETRICS & GYNECOLOGY

## 2024-04-28 PROCEDURE — 250N000011 HC RX IP 250 OP 636

## 2024-04-28 PROCEDURE — 80053 COMPREHEN METABOLIC PANEL: CPT | Performed by: OBSTETRICS & GYNECOLOGY

## 2024-04-28 PROCEDURE — 83735 ASSAY OF MAGNESIUM: CPT | Performed by: OBSTETRICS & GYNECOLOGY

## 2024-04-28 PROCEDURE — 36415 COLL VENOUS BLD VENIPUNCTURE: CPT | Performed by: OBSTETRICS & GYNECOLOGY

## 2024-04-28 PROCEDURE — 85018 HEMOGLOBIN: CPT

## 2024-04-28 RX ORDER — MAGNESIUM OXIDE 400 MG/1
400 TABLET ORAL EVERY 4 HOURS
Status: COMPLETED | OUTPATIENT
Start: 2024-04-28 | End: 2024-04-28

## 2024-04-28 RX ORDER — POTASSIUM CHLORIDE 1500 MG/1
20 TABLET, EXTENDED RELEASE ORAL ONCE
Status: COMPLETED | OUTPATIENT
Start: 2024-04-28 | End: 2024-04-28

## 2024-04-28 RX ADMIN — IBUPROFEN 800 MG: 800 TABLET, FILM COATED ORAL at 16:08

## 2024-04-28 RX ADMIN — OXYCODONE 10 MG: 5 TABLET ORAL at 06:22

## 2024-04-28 RX ADMIN — IBUPROFEN 800 MG: 800 TABLET, FILM COATED ORAL at 03:43

## 2024-04-28 RX ADMIN — MAGNESIUM OXIDE TAB 400 MG (241.3 MG ELEMENTAL MG) 400 MG: 400 (241.3 MG) TAB at 08:47

## 2024-04-28 RX ADMIN — HYDROXYZINE HYDROCHLORIDE 50 MG: 50 TABLET, FILM COATED ORAL at 08:47

## 2024-04-28 RX ADMIN — DOCUSATE SODIUM 100 MG: 100 CAPSULE, LIQUID FILLED ORAL at 08:47

## 2024-04-28 RX ADMIN — ENOXAPARIN SODIUM 40 MG: 40 INJECTION SUBCUTANEOUS at 16:11

## 2024-04-28 RX ADMIN — LIDOCAINE 3 PATCH: 4 PATCH TOPICAL at 08:46

## 2024-04-28 RX ADMIN — HYDROMORPHONE HYDROCHLORIDE 0.3 MG: 1 INJECTION, SOLUTION INTRAMUSCULAR; INTRAVENOUS; SUBCUTANEOUS at 03:43

## 2024-04-28 RX ADMIN — ACETAMINOPHEN 975 MG: 325 TABLET, FILM COATED ORAL at 08:47

## 2024-04-28 RX ADMIN — ACETAMINOPHEN 975 MG: 325 TABLET, FILM COATED ORAL at 16:11

## 2024-04-28 RX ADMIN — POTASSIUM CHLORIDE 20 MEQ: 1500 TABLET, EXTENDED RELEASE ORAL at 06:19

## 2024-04-28 RX ADMIN — IBUPROFEN 800 MG: 800 TABLET, FILM COATED ORAL at 21:04

## 2024-04-28 RX ADMIN — MAGNESIUM OXIDE TAB 400 MG (241.3 MG ELEMENTAL MG) 400 MG: 400 (241.3 MG) TAB at 06:19

## 2024-04-28 RX ADMIN — POLYETHYLENE GLYCOL 3350 17 G: 17 POWDER, FOR SOLUTION ORAL at 08:47

## 2024-04-28 ASSESSMENT — ACTIVITIES OF DAILY LIVING (ADL)
ADLS_ACUITY_SCORE: 28
ADLS_ACUITY_SCORE: 28
ADLS_ACUITY_SCORE: 24
ADLS_ACUITY_SCORE: 28
ADLS_ACUITY_SCORE: 24
ADLS_ACUITY_SCORE: 24
ADLS_ACUITY_SCORE: 28
ADLS_ACUITY_SCORE: 24
ADLS_ACUITY_SCORE: 24
ADLS_ACUITY_SCORE: 27
ADLS_ACUITY_SCORE: 24
ADLS_ACUITY_SCORE: 28
ADLS_ACUITY_SCORE: 24
ADLS_ACUITY_SCORE: 28
ADLS_ACUITY_SCORE: 24
ADLS_ACUITY_SCORE: 28
ADLS_ACUITY_SCORE: 28
ADLS_ACUITY_SCORE: 24

## 2024-04-28 NOTE — PROGRESS NOTES
Brief Progress Note     In to assess patient after notification by RN that she was feeling chest heaviness. Patient visiting with family and friends. Reports symptoms have completely resolved. Notes bilateral anterior shoulder pressure when she got up to go to the bathroom. No SOB or unusual weakness. Voiding spontaneously. Abdominal pain overall well controlled with PO medications.    Vitals:    04/27/24 0800 04/27/24 0900 04/27/24 1000 04/27/24 1600   BP: 96/56 98/68 103/77 114/64   BP Location: Left arm   Left arm   Cuff Size:       Pulse: 90 82 104 100   Resp: 14 19 19 20   Temp: 98.7  F (37.1  C)   98.9  F (37.2  C)   TempSrc: Oral   Oral   SpO2: 97% 97% 99% 99%   Weight:           Gen: appears well; NAD  CV: RRR  Pulm; CTAB    Will continue to monitor overnight. Likely MSK related, however low threshold for EKG and further if symptoms return.    Harika Katz MD  OB/GYN Resident, PGY-3

## 2024-04-28 NOTE — PROVIDER NOTIFICATION
"Paged provider:     \"Room 1039: Patient AM. Patient endorsing heaviness to shoulders and chest when sitting up and ambulating in room. Symptoms the same as previous page from day shift with the addition of mild SOB. IV dilaudid given. MILENA Lazcano\"    Awaiting Response.  Neno Cunha RN  "

## 2024-04-28 NOTE — PROGRESS NOTES
Goal Outcome Evaluation     ICU End of Shift Summary:     General: Patient admitted after planned induction/termination of pregnancy; complicated by post partum hemorrhage and emergent hysterectomy.   Neuro: Alert and orientated X4, pupils PERRLA, no edema noted. Reports pain in shoulders, more noticeable when up and walking to the bathroom. Given tylenol, ibuprofen and atarax this shift, declined needing oxy or dilaudid.   Pulm/Resp: Continues on R/A, lung sounds clear, no secretions.   CV: No EKG monitoring; rate regular upon auscultation.   GI: No bm this shift, passing gas, no N/V.   : Continent of urine, up to the bathroom with assistance from .   Access: 2 PIV's.    Skin Abdominal incision; covered/CDI. Lidocaine patches around incision dressing.     Encouraged walking the floor and getting up and moving, patient agreeable,  stated he would walk with her. Plan is to discharge home tomorrow per OB.

## 2024-04-28 NOTE — PLAN OF CARE
10A ICU End of Shift Summary.     Changes this shift: Heaviness to collar bone and chest when sitting upright or when ambulating in the room. Atarax given and 12 lead EKG ORDERED. Relief when given pain meds. Able to sleep through the night. Requested she not be woke up for bed bath    Neuro: Aox4. Mild pain when walking; relieved with atarax, IV dilaudid, oxy, ibuprofen and tylenol. No abdominal pain  Cardiac: NSR in 80's. No edema present. Pulses 2+  Respiratory: On room air with Sats in high 90%s   GI/: Up to commode with assistance  Diet/appetite: Regular Diet - Prefers sukumar water  Pain: Pain managed with IV dilaudid, oxy, ibuprofen, tylenol and atarax  Skin: Abdominal incision site; abdominal binder in place  LDA's: 2 PIV  Activities: Moves independently in bed    Drips: None       Plan: Transfer patient to [facility] unit to be followed by OB prior to discharge

## 2024-04-28 NOTE — PROGRESS NOTES
[facility]   Post-partum Progress Note    Name:  Naty Joseph  MRN: [id number]    S: Naty is doing well this morning. Does have a small amount of burning pain at the bottom portion of incision. Though overall feels like pain is well controlled. Does continue to have mild right shoulder pain/heaviness. She is ambulating to the bathroom and voiding spontaneously. Did have a bowel movement yesterday. Tolerating PO intake without n/v.      O:   Patient Vitals for the past 24 hrs:   BP Temp Temp src Resp SpO2   24 0800 120/65 98.7  F (37.1  C) Oral 16 97 %   24 0500 111/60 98.4  F (36.9  C) Oral 16 98 %   24 2000 105/57 99  F (37.2  C) Oral -- 99 %   24 1608 111/49 98.5  F (36.9  C) Oral -- 99 %     Gen:  Resting comfortably, NAD  CV:  RR, well perfused  Pulm:  Non-labored breathing on room air  Abd:  Soft, non-distended   Incision: VML incision covered in clean bandage   Ext:  Trace edema, SCDs in place     Hemoglobin   Date Value Ref Range Status   2024 7.9 (L) 11.7 - 15.7 g/dL Final   2024 7.8 (L) 11.7 - 15.7 g/dL Final   2024 7.5 (L) 11.7 - 15.7 g/dL Final   2024 8.1 (L) 11.7 - 15.7 g/dL Final   2024 7.7 (L) 11.7 - 15.7 g/dL Final   2024 7.8 (L) 11.7 - 15.7 g/dL Final   2024 7.8 (L) 11.7 - 15.7 g/dL Final   2024 8.1 (L) 11.7 - 15.7 g/dL Final     Platelet Count   Date Value Ref Range Status   2024 109 (L) 150 - 450 10e3/uL Final   2024 92 (L) 150 - 450 10e3/uL Final   2024 103 (L) 150 - 450 10e3/uL Final   2024 108 (L) 150 - 450 10e3/uL Final   2024 77 (L) 150 - 450 10e3/uL Final   2024 80 (L) 150 - 450 10e3/uL Final   2024 166 150 - 450 10e3/uL Final   2024 115 (L) 150 - 450 10e3/uL Final     Assessment/Plan:  Naty Joseph 28 year old  on PPD#3 s/p  that was complicated by postpartum hemorrhage with DIC ultimately resulting in total abdominal hysterectomy  after which patient was admitted to ICU. Now transferred to Ob service though still physically in ICU. Stable, ready for discharge.     # DIC  # Postpartum hemorrhage  Induction uncomplicated and required only 3 doses of vaginal misoprostol. Subsequent hemorrhage with DIC of unclear etiology though possible that this could represent mirror syndrome in the setting of fetal hydrops. Currently doing well with stabilization of labs. Abdomen remains non-distended this morning and minimal vaginal spotting overnight. VSS  - Hgb stable in the 7s , plts stable in the low 100s; fibrinogen and INR have normalized.      -- Total EBL: 3227. Deliv EBL 1222 (hema, TXA, mthg, miso) > 2005     -- Total prods: 5u pRBC, 5u Cryo, 4u FFP, 1u Plts    # Post-delivery management   Pain: Routine post-delivery regimen including. Tylenol, Ibuprofen, PRN oxycodone/IV dilaudid. Recommend addition of lidocaine patches for incisional pain  GI:  Tolerating regular diet.  : Voiding spontaneously   Rh: Positive  PPx:  Lovenox ppx; will discontinue at discharge      Medically Ready for Discharge: Anticipated Today    Olegario Reardon DO, MS  Obstetrics, Gynecology & Women's Health   Resident, PGY-3  04/29/2024 6:21 AM     Appreciate note by Dr. Reardon. Patient has been seen and examined by me separate from the resident, agree with above note. Bandage removed, incision well healing with steri strips in place. Ready for discharge. Reviewed discharge instructions. Is travelling back to [place], will call our clinic if has concerns. Does not have primary MD in [place].     Mariely Durand MD  12:14 PM

## 2024-04-28 NOTE — PROGRESS NOTES
[facility]   Post-partum Progress Note    Name:  Naty Joseph  MRN: [id number]    S: Patient doing ok today. Pain control improved. Ambulating to the bathroom and voiding spontaneously. Tolerating PO intake without n/v.      O:   Patient Vitals for the past 24 hrs:   BP Temp Temp src Pulse Resp SpO2 Weight   24 0840 105/60 98.2  F (36.8  C) Oral 92 -- 99 % --   24 0400 114/72 98.9  F (37.2  C) Oral 84 15 99 % 74.4 kg (164 lb 0.4 oz)   24 2100 111/67 98.7  F (37.1  C) Oral 88 18 97 % --   24 2055 111/67 98.7  F (37.1  C) Oral 87 18 97 % --   24 1600 114/64 98.9  F (37.2  C) Oral 100 20 99 % --     Gen:  Resting comfortably, NAD  CV:  RR, well perfused  Pulm:  Non-labored breathing on room air  Abd:  Soft, non-distended   Incision: VML incision covered in clean bandage   Ext:  Trace edema, SCDs in place     Hemoglobin   Date Value Ref Range Status   2024 7.5 (L) 11.7 - 15.7 g/dL Final   2024 8.1 (L) 11.7 - 15.7 g/dL Final   2024 7.7 (L) 11.7 - 15.7 g/dL Final   2024 7.8 (L) 11.7 - 15.7 g/dL Final   2024 7.8 (L) 11.7 - 15.7 g/dL Final   2024 8.1 (L) 11.7 - 15.7 g/dL Final   2024 8.3 (L) 11.7 - 15.7 g/dL Final   2024 9.4 (L) 11.7 - 15.7 g/dL Final     Platelet Count   Date Value Ref Range Status   2024 109 (L) 150 - 450 10e3/uL Final   2024 92 (L) 150 - 450 10e3/uL Final   2024 103 (L) 150 - 450 10e3/uL Final   2024 108 (L) 150 - 450 10e3/uL Final   2024 77 (L) 150 - 450 10e3/uL Final   2024 80 (L) 150 - 450 10e3/uL Final   2024 166 150 - 450 10e3/uL Final   2024 115 (L) 150 - 450 10e3/uL Final           Assessment/Plan:  Naty Joseph 28 year old  on PPD#2 s/p  that was complicated by postpartum hemorrhage with DIC ultimately resulting in total abdominal hysterectomy after which patient was admitted to ICU. Patient weaned off pressors. Now transferred to  Ob service though still physically in ICU.    # DIC  # Postpartum hemorrhage  Induction uncomplicated and required only 3 doses of vaginal misoprostol. Subsequent hemorrhage with DIC of unclear etiology though possible that this could represent mirror syndrome in the setting of fetal hydrops. Currently stable in the ICU with stabilization of labs. Abdomen remains non-distended this morning and minimal vaginal spotting overnight. VSS  - Hgb stable in the 7s , plts stable in the low 100s; fibrinogen and INR have normalized.      -- Total EBL: 3227. Deliv EBL 1222 (hema, TXA, mthg, miso) > 2005     -- Total prods: 5u pRBC, 5u Cryo, 4u FFP, 1u Plts    # Post-delivery management   Pain: Routine post-delivery regimen including. Tylenol, Ibuprofen, PRN oxycodone/IV dilaudid. Recommend addition of lidocaine patches for incisional pain  GI:  Tolerating regular diet.  : Voiding spontaneously   Rh: Positive  PPx:  Lovenox ppx  Dispo: Medically Ready for Discharge: Anticipated in 1-2 days.    Ju Ramires MD,  04/28/24 11:45 AM

## 2024-04-29 VITALS
BODY MASS INDEX: 29.06 KG/M2 | TEMPERATURE: 98.7 F | WEIGHT: 164.02 LBS | DIASTOLIC BLOOD PRESSURE: 65 MMHG | SYSTOLIC BLOOD PRESSURE: 120 MMHG | OXYGEN SATURATION: 97 % | HEART RATE: 92 BPM | RESPIRATION RATE: 16 BRPM

## 2024-04-29 LAB
ATRIAL RATE - MUSE: 83 BPM
DIASTOLIC BLOOD PRESSURE - MUSE: NORMAL MMHG
HGB BLD-MCNC: 7.9 G/DL (ref 11.7–15.7)
INTERPRETATION ECG - MUSE: NORMAL
MAGNESIUM SERPL-MCNC: 1.7 MG/DL (ref 1.7–2.3)
P AXIS - MUSE: 60 DEGREES
PHOSPHATE SERPL-MCNC: 4.5 MG/DL (ref 2.5–4.5)
POTASSIUM SERPL-SCNC: 3.9 MMOL/L (ref 3.4–5.3)
PR INTERVAL - MUSE: 162 MS
QRS DURATION - MUSE: 80 MS
QT - MUSE: 370 MS
QTC - MUSE: 434 MS
R AXIS - MUSE: 34 DEGREES
SYSTOLIC BLOOD PRESSURE - MUSE: NORMAL MMHG
T AXIS - MUSE: 22 DEGREES
VENTRICULAR RATE- MUSE: 83 BPM

## 2024-04-29 PROCEDURE — 250N000013 HC RX MED GY IP 250 OP 250 PS 637

## 2024-04-29 PROCEDURE — 36415 COLL VENOUS BLD VENIPUNCTURE: CPT

## 2024-04-29 PROCEDURE — 99238 HOSP IP/OBS DSCHRG MGMT 30/<: CPT | Mod: GC | Performed by: OBSTETRICS & GYNECOLOGY

## 2024-04-29 PROCEDURE — 85018 HEMOGLOBIN: CPT

## 2024-04-29 PROCEDURE — 84132 ASSAY OF SERUM POTASSIUM: CPT

## 2024-04-29 PROCEDURE — 250N000013 HC RX MED GY IP 250 OP 250 PS 637: Performed by: OBSTETRICS & GYNECOLOGY

## 2024-04-29 PROCEDURE — 84100 ASSAY OF PHOSPHORUS: CPT | Performed by: OBSTETRICS & GYNECOLOGY

## 2024-04-29 PROCEDURE — 83735 ASSAY OF MAGNESIUM: CPT | Performed by: OBSTETRICS & GYNECOLOGY

## 2024-04-29 RX ORDER — IBUPROFEN 600 MG/1
600 TABLET, FILM COATED ORAL EVERY 6 HOURS PRN
Qty: 60 TABLET | Refills: 0 | Status: SHIPPED | OUTPATIENT
Start: 2024-04-29

## 2024-04-29 RX ORDER — AMOXICILLIN 250 MG
1 CAPSULE ORAL DAILY
Qty: 100 TABLET | Refills: 0 | Status: SHIPPED | OUTPATIENT
Start: 2024-04-29

## 2024-04-29 RX ORDER — OXYCODONE HYDROCHLORIDE 5 MG/1
5 TABLET ORAL EVERY 6 HOURS PRN
Qty: 12 TABLET | Refills: 0 | Status: SHIPPED | OUTPATIENT
Start: 2024-04-29 | End: 2024-05-02

## 2024-04-29 RX ORDER — ACETAMINOPHEN 325 MG/1
650 TABLET ORAL EVERY 6 HOURS PRN
Qty: 100 TABLET | Refills: 0 | Status: SHIPPED | OUTPATIENT
Start: 2024-04-29

## 2024-04-29 RX ORDER — MAGNESIUM OXIDE 400 MG/1
400 TABLET ORAL EVERY 4 HOURS
Status: DISCONTINUED | OUTPATIENT
Start: 2024-04-29 | End: 2024-04-29 | Stop reason: HOSPADM

## 2024-04-29 RX ADMIN — MAGNESIUM OXIDE TAB 400 MG (241.3 MG ELEMENTAL MG) 400 MG: 400 (241.3 MG) TAB at 08:30

## 2024-04-29 RX ADMIN — POLYETHYLENE GLYCOL 3350 17 G: 17 POWDER, FOR SOLUTION ORAL at 08:29

## 2024-04-29 RX ADMIN — ACETAMINOPHEN 975 MG: 325 TABLET, FILM COATED ORAL at 08:30

## 2024-04-29 RX ADMIN — DOCUSATE SODIUM 100 MG: 100 CAPSULE, LIQUID FILLED ORAL at 08:29

## 2024-04-29 RX ADMIN — IBUPROFEN 800 MG: 800 TABLET, FILM COATED ORAL at 05:27

## 2024-04-29 ASSESSMENT — ACTIVITIES OF DAILY LIVING (ADL)
ADLS_ACUITY_SCORE: 24

## 2024-04-29 NOTE — PLAN OF CARE
Discharged to: Home at 1120  Belongings: With patient and significant other  AVS (After Visit Summary) discussed with: patient and family    PIV removed, surgical incision assessed by RN and OB team. Education of S/S of infection and importance of scheduling follow up appointments with primary/ OB.    Goal Outcome Evaluation:  Problem: Adult Inpatient Plan of Care  Goal: Absence of Hospital-Acquired Illness or Injury  Intervention: Identify and Manage Fall Risk  Recent Flowsheet Documentation  Taken 4/29/2024 0800 by Salud Villareal RN  Safety Promotion/Fall Prevention:   clutter free environment maintained   lighting adjusted   nonskid shoes/slippers when out of bed   room near nurse's station   safety round/check completed  Intervention: Prevent Skin Injury  Recent Flowsheet Documentation  Taken 4/29/2024 0800 by Salud Villareal RN  Body Position: position changed independently  Goal: Optimal Comfort and Wellbeing  Intervention: Provide Person-Centered Care  Recent Flowsheet Documentation  Taken 4/29/2024 0800 by Salud Villareal RN  Trust Relationship/Rapport:   care explained   choices provided   emotional support provided   empathic listening provided   questions answered   questions encouraged   reassurance provided   thoughts/feelings acknowledged     Goal: Fluid and Electrolyte Balance  Outcome: Met  Goal: Acceptable Pain Control  Outcome: Met  Goal: Effective Urinary Elimination  Outcome: Met     Problem: Pain Acute  Goal: Optimal Pain Control and Function  Outcome: Met  Intervention: Prevent or Manage Pain  Recent Flowsheet Documentation  Taken 4/29/2024 0800 by Salud Villareal RN  Medication Review/Management: medications reviewed          Problem: Hysterectomy Total or Partial  Goal: Acceptable Pain Control  Outcome: Met     Problem: Pain Acute  Goal: Optimal Pain Control and Function  Intervention: Prevent or Manage Pain  Recent Flowsheet Documentation  Taken 4/29/2024 0800 by Salud Villareal  RN  Medication Review/Management: medications reviewed     Problem:  Loss  Goal: Optimal Adjustment to Loss  Intervention: Support the Grieving Process  Recent Flowsheet Documentation  Taken 2024 0800 by Salud Villareal RN  Family/Support System Care:   involvement promoted   presence promoted   caregiver stress acknowledged

## 2024-04-29 NOTE — PROGRESS NOTES
ICU End of Shift Summary:     Neuro: Afebrile, A&O x4,shoulder pain persist with activity per pt, managed with heat application    Resp: LS CTAB  CV: Normotensive, off Tele  GI: Reg, good appetite  : Continent, up to the bathroom independently  Access:  PIV x1  Skin : Grossly intact, mid abdominal incisional dressing c/d/I  Plan Possible discharge later today

## 2024-04-29 NOTE — PROVIDER NOTIFICATION
Paged OBGYN resident about any pending discharge orders need to be completed by RN. Also patient still has original surgical dressing and there are no RN orders to change dressing

## 2024-04-30 ENCOUNTER — TELEPHONE (OUTPATIENT)
Dept: OBGYN | Facility: CLINIC | Age: 29
End: 2024-04-30
Payer: COMMERCIAL

## 2024-04-30 NOTE — TELEPHONE ENCOUNTER
TC to check on patient and report that pathology is not back yet. Will send [employer] message so she can message me back prn.   Osiris Raphael MD

## 2024-05-02 NOTE — TELEPHONE ENCOUNTER
Attempted to call patient to follow up after IOL. Procedure was on 4/25/24 at 22w4d (gest age) for fetal brain anomalies.  Complicated by postpartum hemorrhage, coagulopathy which resulted in hysterectomy.    Left message on voicemail to call back

## 2024-05-06 LAB
PATH REPORT.COMMENTS IMP SPEC: NORMAL
PATH REPORT.COMMENTS IMP SPEC: NORMAL
PATH REPORT.FINAL DX SPEC: NORMAL
PATH REPORT.GROSS SPEC: NORMAL
PATH REPORT.MICROSCOPIC SPEC OTHER STN: NORMAL
PATH REPORT.RELEVANT HX SPEC: NORMAL
PHOTO IMAGE: NORMAL

## 2024-05-07 ENCOUNTER — TELEPHONE (OUTPATIENT)
Dept: OBGYN | Facility: CLINIC | Age: 29
End: 2024-05-07
Payer: COMMERCIAL

## 2024-05-07 DIAGNOSIS — G89.18 ACUTE POST-OPERATIVE PAIN: Primary | ICD-10-CM

## 2024-05-07 RX ORDER — OXYCODONE HYDROCHLORIDE 5 MG/1
5 TABLET ORAL EVERY 6 HOURS PRN
Qty: 12 TABLET | Refills: 0 | Status: SHIPPED | OUTPATIENT
Start: 2024-05-07

## 2024-05-07 NOTE — TELEPHONE ENCOUNTER
"Called patient to follow up after  IOL with hysterectomy for PP hemorrhage . Procedure was on 4/25/24 at 22w4d (gest age) for fetal anomalies.    Bleeding/clots: no clots, pink spotting especially when walking around    Pain/cramping: mostly at night feels pain that makes it difficult to sleep at top and bottom of incision.  Incision appears intact without discharge.    Pregnancy symptoms: none, May be present for up to 2-4 weeks post procedure as pregnancy hormones leave the body.     Mood: states that she is doing OK, and \"working through it\"  EPDS score: declines  MH referral? declines    Follow up appointment: she would prefer to do her follow up closer to home.  She will contact her clinic to see if they are willing to do her post op, and will call back if she needs follow up here.    Reviewed recommendation for pelvic rest for 2 weeks. Discussed need to seek emergency care if experiencing fever, chills, diarrhea, purulent discharge/lochia, or if saturating a pad front to back, side to side within an hour. Patient verbalized understanding. Encouraged patient to call back with questions or concerns.           "

## 2024-05-07 NOTE — TELEPHONE ENCOUNTER
Pt had abdominal hysterectomy on 4/25 and is requesting more oxycodone. Routed to prescriber for approval.

## 2024-05-07 NOTE — TELEPHONE ENCOUNTER
Refills have been requested for the following medications:         oxyCODONE (ROXICODONE) 5 MG tablet [Mariely Durand]     Preferred pharmacy: [facility]

## 2024-05-08 NOTE — ANESTHESIA POSTPROCEDURE EVALUATION
Patient: Naty Joseph    Procedure: Procedure(s):  Dilation and curettage suction  Hysterectomy total abdominal       Anesthesia Type:  Epidural    Note:  Disposition: Admission; ICU            ICU Sign Out: Anesthesiologist/ICU physician sign out WAS performed   Postop Pain Control: Uneventful            Sign Out: Well controlled pain   PONV: No   Neuro/Psych: Uneventful            Sign Out: Acceptable/Baseline neuro status   Airway/Respiratory:             Sign Out: Acceptable/Baseline resp. status   CV/Hemodynamics:             Sign Out: Acceptable CV status   Other NRE:             Transfusion: Massive Transfusion Protocol   DID A NON-ROUTINE EVENT OCCUR? YES    Event details/Postop Comments:  Unanticipated MTP and ICU admission. Pt resuscitated and transferred stable and intubated to the ICU     Epidural-to- Updated ASA: 3 Emergent      Last vitals:  Vitals:    24 2000 24 0500 24 0800   BP: 105/57 111/60 120/65   Pulse:      Resp:  16 16   Temp: 37.2  C (99  F) 36.9  C (98.4  F) 37.1  C (98.7  F)   SpO2: 99% 98% 97%       Electronically Signed By: Luz Pierre MD  May 7, 2024  10:21 PM

## 2024-05-19 ENCOUNTER — HEALTH MAINTENANCE LETTER (OUTPATIENT)
Age: 29
End: 2024-05-19

## 2024-07-01 ENCOUNTER — TELEPHONE (OUTPATIENT)
Dept: OBGYN | Facility: CLINIC | Age: 29
End: 2024-07-01
Payer: COMMERCIAL

## 2024-07-01 NOTE — TELEPHONE ENCOUNTER
Hannah Gupta from [facility]. Reviewed that only pathology available is from 04/25 and they can access this already.

## 2024-07-01 NOTE — TELEPHONE ENCOUNTER
[facility] Call Center    Phone Message    May a detailed message be left on voicemail: yes     Reason for Call: Candy SHERIDAN who referred patient to us looking for some records for pt birth from 4/25 that don't show on care everywhere.   Please follow up with Candy. Thank you    Action Taken: Message routed to:  Other: [facility]    Travel Screening: Not Applicable     Date of Service: 4/25

## 2025-06-08 ENCOUNTER — HEALTH MAINTENANCE LETTER (OUTPATIENT)
Age: 30
End: 2025-06-08

## (undated) DEVICE — SU VICRYL 2-0 TIE 54" J607H

## (undated) DEVICE — SOL WATER IRRIG 1000ML BOTTLE 2F7114

## (undated) DEVICE — SPONGE LAP 18X18" X8435

## (undated) DEVICE — GLOVE PROTEXIS W/NEU-THERA 8.0  2D73TE80

## (undated) DEVICE — GOWN XLG DISP 9545

## (undated) DEVICE — PREP SKIN SCRUB TRAY 4461A

## (undated) DEVICE — PANTIES MESH LG/XLG 2PK 706M2

## (undated) DEVICE — GLOVE PROTEXIS W/NEU-THERA 7.5  2D73TE75

## (undated) DEVICE — DRSG TELFA 3X8" 1238

## (undated) DEVICE — PREP POVIDONE IODINE SOLUTION 10% 120ML

## (undated) DEVICE — CATH TRAY FOLEY 16FR BARDEX W/DRAIN BAG STATLOCK 300316A

## (undated) DEVICE — LIGHT HANDLE X2

## (undated) DEVICE — TAPE MEDIPORE 4"X2YD 2864

## (undated) DEVICE — Device

## (undated) DEVICE — LINEN ORTHO PACK 5446

## (undated) DEVICE — STRAP KNEE/BODY 31143004

## (undated) DEVICE — BASIN SET MAJOR

## (undated) DEVICE — LINEN GOWN X4 5410

## (undated) DEVICE — GLOVE PROTEXIS W/NEU-THERA 8.5  2D73TE85

## (undated) DEVICE — PAD CHUX UNDERPAD 30X30"

## (undated) DEVICE — SOL NACL 0.9% IRRIG 1000ML BOTTLE 2F7124

## (undated) DEVICE — SUCTION MANIFOLD DORNOCH ULTRA CART UL-CL500

## (undated) DEVICE — PREP DYNA-HEX 4% CHG SCRUB 4OZ BOTTLE MDS098710

## (undated) DEVICE — SU VICRYL 0 CT-1 36" J346H

## (undated) DEVICE — LINEN TOWEL PACK X5 5464

## (undated) DEVICE — SU PLAIN 3-0 CT 27" 852H

## (undated) RX ORDER — MIFEPRISTONE 200 MG/1
TABLET ORAL
Status: DISPENSED
Start: 2024-04-24

## (undated) RX ORDER — PROPOFOL 10 MG/ML
INJECTION, EMULSION INTRAVENOUS
Status: DISPENSED
Start: 2024-04-25

## (undated) RX ORDER — BUPIVACAINE HYDROCHLORIDE 2.5 MG/ML
INJECTION, SOLUTION EPIDURAL; INFILTRATION; INTRACAUDAL
Status: DISPENSED
Start: 2024-04-26